# Patient Record
Sex: FEMALE | Race: WHITE | Employment: FULL TIME | URBAN - METROPOLITAN AREA
[De-identification: names, ages, dates, MRNs, and addresses within clinical notes are randomized per-mention and may not be internally consistent; named-entity substitution may affect disease eponyms.]

---

## 2018-04-10 PROBLEM — R07.89 OTHER CHEST PAIN: Status: ACTIVE | Noted: 2018-04-10

## 2018-05-29 ENCOUNTER — HOSPITAL ENCOUNTER (OUTPATIENT)
Dept: LAB | Age: 53
Discharge: HOME | End: 2018-05-29
Payer: COMMERCIAL

## 2018-05-29 DIAGNOSIS — R94.39 ABNORMAL FINDING ON CARDIOVASCULAR STRESS TEST: ICD-10-CM

## 2018-05-29 DIAGNOSIS — R07.89 OTHER CHEST PAIN: ICD-10-CM

## 2018-05-29 LAB
ANION GAP SERPL CALC-SCNC: 7 MMOL/L
BASOPHILS # BLD: 0.1 K/UL (ref 0–0.2)
BASOPHILS NFR BLD: 1 % (ref 0–2)
BUN SERPL-MCNC: 17 MG/DL (ref 6–23)
CALCIUM SERPL-MCNC: 9.9 MG/DL (ref 8.3–10.4)
CHLORIDE SERPL-SCNC: 104 MMOL/L (ref 98–107)
CO2 SERPL-SCNC: 28 MMOL/L (ref 21–32)
CREAT SERPL-MCNC: 1 MG/DL (ref 0.6–1)
DIFFERENTIAL METHOD BLD: ABNORMAL
EOSINOPHIL # BLD: 0.4 K/UL (ref 0–0.8)
EOSINOPHIL NFR BLD: 4 % (ref 0.5–7.8)
ERYTHROCYTE [DISTWIDTH] IN BLOOD BY AUTOMATED COUNT: 13.6 % (ref 11.9–14.6)
GLUCOSE SERPL-MCNC: 113 MG/DL (ref 65–100)
HCT VFR BLD AUTO: 42.1 % (ref 35.8–46.3)
HGB BLD-MCNC: 13.8 G/DL (ref 11.7–15.4)
LYMPHOCYTES # BLD: 2.3 K/UL (ref 0.5–4.6)
LYMPHOCYTES NFR BLD: 23 % (ref 13–44)
MCH RBC QN AUTO: 28.6 PG (ref 26.1–32.9)
MCHC RBC AUTO-ENTMCNC: 32.8 G/DL (ref 31.4–35)
MCV RBC AUTO: 87.2 FL (ref 79.6–97.8)
MONOCYTES # BLD: 0.8 K/UL (ref 0.1–1.3)
MONOCYTES NFR BLD: 8 % (ref 4–12)
NEUTS SEG # BLD: 6.5 K/UL (ref 1.7–8.2)
NEUTS SEG NFR BLD: 64 % (ref 43–78)
PLATELET # BLD AUTO: 260 K/UL (ref 150–450)
PMV BLD AUTO: 10.3 FL (ref 10.8–14.1)
POTASSIUM SERPL-SCNC: 3.7 MMOL/L (ref 3.5–5.1)
RBC # BLD AUTO: 4.83 M/UL (ref 4.05–5.25)
SODIUM SERPL-SCNC: 139 MMOL/L (ref 136–145)
WBC # BLD AUTO: 10 K/UL (ref 4.3–11.1)

## 2018-05-29 PROCEDURE — 80048 BASIC METABOLIC PNL TOTAL CA: CPT | Performed by: INTERNAL MEDICINE

## 2018-05-29 PROCEDURE — 85025 COMPLETE CBC W/AUTO DIFF WBC: CPT | Performed by: INTERNAL MEDICINE

## 2018-05-29 PROCEDURE — 36415 COLL VENOUS BLD VENIPUNCTURE: CPT | Performed by: INTERNAL MEDICINE

## 2018-05-31 RX ORDER — BETAMETHASONE DIPROPIONATE 0.5 MG/G
LOTION TOPICAL 2 TIMES DAILY
COMMUNITY

## 2018-05-31 RX ORDER — DICLOFENAC SODIUM 10 MG/G
GEL TOPICAL
COMMUNITY
End: 2020-05-01

## 2018-05-31 RX ORDER — ASPIRIN 81 MG/1
81 TABLET ORAL DAILY
COMMUNITY
End: 2020-05-01

## 2018-06-01 ENCOUNTER — HOSPITAL ENCOUNTER (OUTPATIENT)
Dept: CARDIAC CATH/INVASIVE PROCEDURES | Age: 53
Discharge: HOME | End: 2018-06-01
Attending: INTERNAL MEDICINE | Admitting: INTERNAL MEDICINE
Payer: COMMERCIAL

## 2018-06-01 VITALS
RESPIRATION RATE: 18 BRPM | OXYGEN SATURATION: 96 % | DIASTOLIC BLOOD PRESSURE: 67 MMHG | WEIGHT: 293 LBS | SYSTOLIC BLOOD PRESSURE: 129 MMHG | HEIGHT: 66 IN | BODY MASS INDEX: 47.09 KG/M2 | HEART RATE: 72 BPM

## 2018-06-01 PROCEDURE — 74011000250 HC RX REV CODE- 250: Performed by: INTERNAL MEDICINE

## 2018-06-01 PROCEDURE — 77030019569 HC BND COMPR RAD TERU -B

## 2018-06-01 PROCEDURE — 77030015766

## 2018-06-01 PROCEDURE — C1894 INTRO/SHEATH, NON-LASER: HCPCS

## 2018-06-01 PROCEDURE — C1769 GUIDE WIRE: HCPCS

## 2018-06-01 PROCEDURE — 74011636320 HC RX REV CODE- 636/320: Performed by: INTERNAL MEDICINE

## 2018-06-01 PROCEDURE — 74011250636 HC RX REV CODE- 250/636

## 2018-06-01 PROCEDURE — 93458 L HRT ARTERY/VENTRICLE ANGIO: CPT

## 2018-06-01 PROCEDURE — 77030004534 HC CATH ANGI DX INFN CARD -A

## 2018-06-01 PROCEDURE — 99152 MOD SED SAME PHYS/QHP 5/>YRS: CPT

## 2018-06-01 PROCEDURE — 74011250636 HC RX REV CODE- 250/636: Performed by: INTERNAL MEDICINE

## 2018-06-01 RX ORDER — LIDOCAINE HYDROCHLORIDE 20 MG/ML
1-20 INJECTION, SOLUTION INFILTRATION; PERINEURAL
Status: DISCONTINUED | OUTPATIENT
Start: 2018-06-01 | End: 2018-06-01 | Stop reason: HOSPADM

## 2018-06-01 RX ORDER — HEPARIN SODIUM 200 [USP'U]/100ML
3 INJECTION, SOLUTION INTRAVENOUS CONTINUOUS
Status: DISCONTINUED | OUTPATIENT
Start: 2018-06-01 | End: 2018-06-01 | Stop reason: HOSPADM

## 2018-06-01 RX ORDER — SODIUM CHLORIDE 0.9 % (FLUSH) 0.9 %
5-10 SYRINGE (ML) INJECTION AS NEEDED
Status: DISCONTINUED | OUTPATIENT
Start: 2018-06-01 | End: 2018-06-01 | Stop reason: HOSPADM

## 2018-06-01 RX ORDER — SODIUM CHLORIDE 0.9 % (FLUSH) 0.9 %
5-10 SYRINGE (ML) INJECTION EVERY 8 HOURS
Status: DISCONTINUED | OUTPATIENT
Start: 2018-06-01 | End: 2018-06-01 | Stop reason: HOSPADM

## 2018-06-01 RX ORDER — SODIUM CHLORIDE 9 MG/ML
250 INJECTION, SOLUTION INTRAVENOUS CONTINUOUS
Status: DISCONTINUED | OUTPATIENT
Start: 2018-06-01 | End: 2018-06-01 | Stop reason: HOSPADM

## 2018-06-01 RX ORDER — MIDAZOLAM HYDROCHLORIDE 1 MG/ML
.5-2 INJECTION, SOLUTION INTRAMUSCULAR; INTRAVENOUS
Status: DISCONTINUED | OUTPATIENT
Start: 2018-06-01 | End: 2018-06-01 | Stop reason: HOSPADM

## 2018-06-01 RX ORDER — FENTANYL CITRATE 50 UG/ML
25-50 INJECTION, SOLUTION INTRAMUSCULAR; INTRAVENOUS
Status: DISCONTINUED | OUTPATIENT
Start: 2018-06-01 | End: 2018-06-01 | Stop reason: HOSPADM

## 2018-06-01 RX ADMIN — FENTANYL CITRATE 50 MCG: 50 INJECTION, SOLUTION INTRAMUSCULAR; INTRAVENOUS at 11:05

## 2018-06-01 RX ADMIN — HEPARIN SODIUM 3 ML/HR: 200 INJECTION, SOLUTION INTRAVENOUS at 11:01

## 2018-06-01 RX ADMIN — LIDOCAINE HYDROCHLORIDE 60 MG: 20 INJECTION, SOLUTION INFILTRATION; PERINEURAL at 11:15

## 2018-06-01 RX ADMIN — FENTANYL CITRATE 25 MCG: 50 INJECTION, SOLUTION INTRAMUSCULAR; INTRAVENOUS at 11:15

## 2018-06-01 RX ADMIN — MIDAZOLAM HYDROCHLORIDE 1 MG: 1 INJECTION, SOLUTION INTRAMUSCULAR; INTRAVENOUS at 11:15

## 2018-06-01 RX ADMIN — MIDAZOLAM HYDROCHLORIDE 2 MG: 1 INJECTION, SOLUTION INTRAMUSCULAR; INTRAVENOUS at 11:05

## 2018-06-01 RX ADMIN — IOPAMIDOL 80 ML: 755 INJECTION, SOLUTION INTRAVENOUS at 11:24

## 2018-06-01 RX ADMIN — HEPARIN SODIUM 2 ML: 10000 INJECTION, SOLUTION INTRAVENOUS; SUBCUTANEOUS at 11:15

## 2018-06-01 NOTE — DISCHARGE INSTRUCTIONS
Cardiac Catheterization/Angiography Discharge Instructions    *Check the puncture site frequently for swelling or bleeding. If you see any bleeding, lie down and apply pressure over the area with a clean town or washcloth. Notify your doctor for any redness, swelling, drainage or oozing from the puncture site. Notify your doctor for any fever or chills. *If the leg or arm with the puncture becomes cold, numb or painful, call Dr Ambreen Munoz at  [phone]    *Activity should be limited for the next 48 hours. Climb stairs as little as possible and avoid any stooping, bending or strenuous activity for 48 hours. No heavy lifting (anything over 10 pounds) for three days. *Do not drive for 48 hours. *You may resume your usual diet. Drink more fluids than usual.    *Have a responsible person drive you home and stay with you for at least 24 hours after your heart catheterization/angiography. *You may remove the bandage from your Right and Arm in 24 hours. You may shower in 24 hours. No tub baths, hot tubs or swimming for one week. Do not place any lotions, creams, powders, ointments over the puncture site for one week. You may place a clean band-aid over the puncture site each day for 5 days. Change this daily.

## 2018-06-01 NOTE — PROCEDURES
[address] [facility] CATH    Dorene Gutierrez  MR#: [id number]  : 1965  ACCOUNT #: [de-identified]   DATE OF SERVICE: 2018    PRIMARY CARDIOLOGIST:  Elham Quinonez MD    PRIMARY CARE PHYSICIAN:  Ozzie Truner NP    BRIEF HISTORY:  The patient is a 58-year-old morbidly obese female who was recently seen for chest pain syndrome. She underwent stress testing with moderate risk anteroseptal ischemia with recurrent chest discomfort, referred for cardiac catheterization. DESCRIPTION OF PROCEDURE:  After informed consent, she was prepped and draped in the usual sterile fashion. The right wrist was infiltrated with lidocaine. The right radial artery was accessed. A 6-Ukrainian sheath was advanced. A 5-Ukrainian Tiger catheter was utilized for left and right coronary injections and a 5-Ukrainian angled pigtail for left ventriculography. CONSCIOUS SEDATION:  Start time 10:58, end time 11:18. MEDICATIONS:  3 mg of Versed and 75 mcg of fentanyl. MONITORING RN:  Zhane Lira    FINDINGS:  1. Left ventricle:  Normal left ventricular size, normal left ventricular systolic function, ejection fraction 65%. There is no significant mitral regurgitation. There is no aortic valve gradient. Left ventricular end-diastolic pressure is measured at 11 mmHg. 2.  Left main:  Left main is large, bifurcates in the LAD and circumflex systems, appears angiographically normal.  3.  Left anterior descending coronary artery: It is a very large vessel, gives rise to a large mid vessel diagonal.  The entire LAD system appears angiographically normal.  4.  Left circumflex coronary artery: It is a large system. It is a conduit to a single large obtuse marginal.  Entire system appears angiographically normal.  5.  Right coronary artery:   It is a large anatomically dominant vessel, gives rise to a moderate sized posterior descending and moderate sized posterolateral branches appear angiographically normal.    Successful hemostasis with pneumatic radial band. CONCLUSION:  1. Normal left ventricular systolic function with normal end-diastolic pressures. 2.  Normal appearing coronary arteries.       MD ADI Russell / Chel Mercado  D: 06/01/2018 11:34     T: 06/01/2018 11:51  JOB #: [id number]  CC: Anabela Combs NP  [facility] INTERNAL MEDICINE  [address] [place], [address]  [address]  CC: Leon Gomez MD

## 2018-06-01 NOTE — PROCEDURES
Brief Cardiac Procedure Note    Patient: Saida Arteaga MRN: [id number]  SSN: [id number]    YOB: 1965  Age: 46 y.o. Sex: female      Date of Procedure: 6/1/2018     Pre-procedure Diagnosis: Atypical Angina    Post-procedure Diagnosis: Non-cardiac Chest Pain    Procedure: Left Heart Catheterization    Brief Description of Procedure: See note    Performed By: uY Kaplan MD     Assistants: None    Anesthesia: Moderate Sedation    Estimated Blood Loss: Less than 10 mL      Specimens: None    Implants: None    Findings:   LV:  EF 65%  LM:  NML  LAD:  NML  LCx:  NML  RCA:  NML    Complications: None    Recommendations: Continue medical therapy.     Signed By: Yu Kaplan MD     June 1, 2018

## 2018-06-01 NOTE — PROGRESS NOTES
Patient received to [address] [place] [address] room # 12  Ambulatory from [facility]. Patient scheduled for [facility] today with Dr Ezra Oliva. Procedure reviewed & questions answered, voiced good understanding consent obtained & placed on chart. All medications and medical history reviewed. Will prep patient per orders. Patient & family updated on plan of care. The patient has a fraility score of 3-MANAGING WELL, based on patient's ability to perform ADL's independently, patient A&Ox3, patient able to ambulate to room without difficulty.

## 2018-06-01 NOTE — IP AVS SNAPSHOT
[facility] 
 
 
 [address] 
[phone] Patient: Kathy Dior MRN: [id number] :1965 Discharge Summary 2018 Kathy Dior MRN[de-identified]  [id number] Admission Information Provider Pager Service Admission Date Expected D/C Date Latesha Potts MD  CARDIAC CATH LAB 2018 Actual LOS Patient Class 0 days OUTPATIENT Follow-up Information Follow up With Details Comments Contact Info Cricket Martin MD On 2018 @ 10:30 am  [address] [facility] [address] [facility] [address] 
[phone] My Medications TAKE these medications as instructed Instructions Each Dose to Equal  
 Morning Noon Evening Bedtime ALPRAZolam 0.25 mg tablet Commonly known as:  Elviamond [place] Your last dose was: Your next dose is: Take 0.25 mg by mouth two (2) times a day. 0.25 mg  
    
   
   
   
  
 aspirin delayed-release 81 mg tablet Your last dose was: Your next dose is: Take 81 mg by mouth daily. 81 mg  
    
   
   
   
  
 B-12 KIT INJECTION Your last dose was: Your next dose is:    
   
   
 by Injection route every month. betamethasone dipropionate 0.05 % topical lotion Commonly known as:  Yetta Miracle Your last dose was: Your next dose is:    
   
   
 Apply  to affected area two (2) times a day. cholecalciferol 1,000 unit tablet Commonly known as:  VITAMIN D3 Your last dose was: Your next dose is: Take  by mouth daily. diclofenac 1 % Gel Commonly known as:  VOLTAREN Your last dose was: Your next dose is:    
   
   
 Apply  to affected area four (4) times daily as needed. hydroCHLOROthiazide 12.5 mg tablet Commonly known as:  HYDRODIURIL  
   
 Your last dose was: Your next dose is: Take 12.5 mg by mouth daily. 12.5 mg  
    
   
   
   
  
 levothyroxine 112 mcg tablet Commonly known as:  SYNTHROID Your last dose was: Your next dose is: Take 112 mcg by mouth Daily (before breakfast). 112 mcg  
    
   
   
   
  
 metFORMIN 500 mg tablet Commonly known as:  GLUCOPHAGE Your last dose was: Your next dose is: Take  by mouth two (2) times daily (with meals). metoprolol succinate 25 mg XL tablet Commonly known as:  TOPROL-XL Your last dose was: Your next dose is: Take 1 Tab by mouth daily. 25 mg  
    
   
   
   
  
 nitroglycerin 0.4 mg SL tablet Commonly known as:  NITROSTAT Your last dose was: Your next dose is:    
   
   
 1 Tab by SubLINGual route every five (5) minutes as needed for Chest Pain. Indications: Angina  
 0.4 mg PROTONIX 40 mg tablet Generic drug:  pantoprazole Your last dose was: Your next dose is: Take 40 mg by mouth two (2) times a day. 40 mg  
    
   
   
   
  
  
  
  
 
  
General Information Please provide this summary of care documentation to your next provider. Allergies No Known Allergies Current Immunizations  Never Reviewed No immunizations on file. Discharge Instructions Discharge Instructions Cardiac Catheterization/Angiography Discharge Instructions *Check the puncture site frequently for swelling or bleeding. If you see any bleeding, lie down and apply pressure over the area with a clean town or washcloth. Notify your doctor for any redness, swelling, drainage or oozing from the puncture site. Notify your doctor for any fever or chills. *If the leg or arm with the puncture becomes cold, numb or painful, call Dr Chyu Foster at  [phone] *Activity should be limited for the next 48 hours. Climb stairs as little as possible and avoid any stooping, bending or strenuous activity for 48 hours. No heavy lifting (anything over 10 pounds) for three days. *Do not drive for 48 hours. *You may resume your usual diet. Drink more fluids than usual. 
 
*Have a responsible person drive you home and stay with you for at least 24 hours after your heart catheterization/angiography. *You may remove the bandage from your Right and Arm in 24 hours. You may shower in 24 hours. No tub baths, hot tubs or swimming for one week. Do not place any lotions, creams, powders, ointments over the puncture site for one week. You may place a clean band-aid over the puncture site each day for 5 days. Change this daily. Discharge Orders None  
  
` Patient Signature:  ____________________________________________________________ Date:  ____________________________________________________________  
  
 [address] Provider Signature:  ____________________________________________________________ Date:  ____________________________________________________________

## 2018-06-01 NOTE — PROGRESS NOTES
TRANSFER - OUT REPORT:    Verbal report given to Josias Beaulieu RN on [address]  being transferred to [facility] for routine progression of care       Report consisted of patients Situation, Background, Assessment and Recommendations(SBAR). Information from the following report(s) SBAR, Kardex, Procedure Summary and MAR was reviewed with the receiving nurse. Opportunity for questions and clarification was provided.       [facility] with Dr Denzel Garrido  No intervention  3 versed  75 fentanyl  Right radial R band 13ml at 1125

## 2019-08-13 ENCOUNTER — HOSPITAL ENCOUNTER (OUTPATIENT)
Dept: GENERAL RADIOLOGY | Age: 54
Discharge: HOME | End: 2019-08-13
Payer: COMMERCIAL

## 2019-08-13 DIAGNOSIS — M54.41 LUMBAGO WITH SCIATICA, RIGHT SIDE: ICD-10-CM

## 2019-08-13 PROCEDURE — 72202 X-RAY EXAM SI JOINTS 3/> VWS: CPT

## 2020-05-01 PROBLEM — R00.0 TACHYCARDIA: Status: ACTIVE | Noted: 2020-05-01

## 2020-05-01 PROBLEM — E66.01 CLASS 3 SEVERE OBESITY DUE TO EXCESS CALORIES WITHOUT SERIOUS COMORBIDITY WITH BODY MASS INDEX (BMI) OF 40.0 TO 44.9 IN ADULT (HCC): Status: ACTIVE | Noted: 2020-05-01

## 2020-05-28 PROBLEM — G47.33 OSA (OBSTRUCTIVE SLEEP APNEA): Status: ACTIVE | Noted: 2020-05-28

## 2020-05-28 PROBLEM — R00.1 BRADYCARDIA: Status: ACTIVE | Noted: 2020-05-28

## 2021-10-08 PROBLEM — U07.1 COVID-19: Status: ACTIVE | Noted: 2021-10-08

## 2022-01-26 ENCOUNTER — HOSPITAL ENCOUNTER (OUTPATIENT)
Dept: GENERAL RADIOLOGY | Age: 57
Discharge: HOME | End: 2022-01-26
Payer: COMMERCIAL

## 2022-01-26 ENCOUNTER — HOSPITAL ENCOUNTER (OUTPATIENT)
Dept: LAB | Age: 57
Discharge: HOME | End: 2022-01-26
Payer: COMMERCIAL

## 2022-01-26 DIAGNOSIS — R06.02 SOB (SHORTNESS OF BREATH): ICD-10-CM

## 2022-01-26 DIAGNOSIS — R00.0 TACHYCARDIA: ICD-10-CM

## 2022-01-26 LAB
BNP SERPL-MCNC: 18 PG/ML (ref 5–125)
CRP SERPL HS-MCNC: 17.2 MG/L
D DIMER PPP FEU-MCNC: 0.54 UG/ML(FEU)
T4 FREE SERPL-MCNC: 1.1 NG/DL (ref 0.78–1.46)

## 2022-01-26 PROCEDURE — 85379 FIBRIN DEGRADATION QUANT: CPT

## 2022-01-26 PROCEDURE — 86141 C-REACTIVE PROTEIN HS: CPT

## 2022-01-26 PROCEDURE — 36415 COLL VENOUS BLD VENIPUNCTURE: CPT

## 2022-01-26 PROCEDURE — 84439 ASSAY OF FREE THYROXINE: CPT

## 2022-01-26 PROCEDURE — 71046 X-RAY EXAM CHEST 2 VIEWS: CPT

## 2022-01-26 PROCEDURE — 83880 ASSAY OF NATRIURETIC PEPTIDE: CPT

## 2022-02-17 ENCOUNTER — HOSPITAL ENCOUNTER (OUTPATIENT)
Dept: CT IMAGING | Age: 57
Discharge: HOME | End: 2022-02-17
Attending: INTERNAL MEDICINE

## 2022-02-17 DIAGNOSIS — R06.02 SOB (SHORTNESS OF BREATH): ICD-10-CM

## 2022-02-17 RX ORDER — SODIUM CHLORIDE 0.9 % (FLUSH) 0.9 %
10 SYRINGE (ML) INJECTION
Status: COMPLETED | OUTPATIENT
Start: 2022-02-17 | End: 2022-02-17

## 2022-02-17 RX ADMIN — Medication 10 ML: at 09:38

## 2022-03-18 PROBLEM — R94.39 ABNORMAL FINDING ON CARDIOVASCULAR STRESS TEST: Status: ACTIVE | Noted: 2018-05-29

## 2022-03-18 PROBLEM — U07.1 COVID-19: Status: ACTIVE | Noted: 2021-10-08

## 2022-03-19 PROBLEM — R06.02 SOB (SHORTNESS OF BREATH): Status: ACTIVE | Noted: 2022-01-26

## 2022-03-19 PROBLEM — E66.01 CLASS 3 SEVERE OBESITY DUE TO EXCESS CALORIES WITHOUT SERIOUS COMORBIDITY WITH BODY MASS INDEX (BMI) OF 40.0 TO 44.9 IN ADULT (HCC): Status: ACTIVE | Noted: 2020-05-01

## 2022-03-19 PROBLEM — R00.0 TACHYCARDIA: Status: ACTIVE | Noted: 2020-05-01

## 2022-03-19 PROBLEM — G47.33 OSA (OBSTRUCTIVE SLEEP APNEA): Status: ACTIVE | Noted: 2020-05-28

## 2022-03-19 PROBLEM — R07.89 OTHER CHEST PAIN: Status: ACTIVE | Noted: 2018-04-10

## 2022-03-19 PROBLEM — E66.813 CLASS 3 SEVERE OBESITY DUE TO EXCESS CALORIES WITHOUT SERIOUS COMORBIDITY WITH BODY MASS INDEX (BMI) OF 40.0 TO 44.9 IN ADULT: Status: ACTIVE | Noted: 2020-05-01

## 2022-03-20 PROBLEM — R00.1 BRADYCARDIA: Status: ACTIVE | Noted: 2020-05-28

## 2022-04-26 PROBLEM — E11.9 TYPE 2 DIABETES MELLITUS (HCC): Status: ACTIVE | Noted: 2022-04-26

## 2022-04-26 PROBLEM — E78.00 PURE HYPERCHOLESTEROLEMIA: Status: ACTIVE | Noted: 2022-04-26

## 2022-07-20 ENCOUNTER — TELEPHONE (OUTPATIENT)
Dept: CARDIOLOGY CLINIC | Age: 57
End: 2022-07-20

## 2022-07-20 RX ORDER — GLIPIZIDE 2.5 MG/1
2.5 TABLET, EXTENDED RELEASE ORAL DAILY
COMMUNITY
Start: 2022-06-22

## 2022-07-20 NOTE — TELEPHONE ENCOUNTER
Increased SOB with any activity continues since she had COVID-19 a 2nd time 6/15/22. No SOB when resting on couch. O2-96% at rest, 93-94% with any activity. Very tired. Swollen ankles when she is up walking around during the day. No weight gain. HR-67-70 at rest, 90-99 with any exertion. No chest pain. States pulmonologist thought she had \"long COVID\". Concerned that SOB and tiredness are not improving with time after 2nd COVID-19 infection. Requests appointment with Dr. Ely Doran. Scheduled next available appointment with Dr. Ely Doran on 8/1/22 at 3:30 pm at [facility]. Advised patient to call with any questions or concerns prior to appointment. Patient verbalized understanding.

## 2022-07-20 NOTE — TELEPHONE ENCOUNTER
Patient called stating she has been experiencing SOB and tiredness. No active chest pain. Please call patient and advise.

## 2022-08-01 ENCOUNTER — OFFICE VISIT (OUTPATIENT)
Dept: CARDIOLOGY CLINIC | Age: 57
End: 2022-08-01
Payer: COMMERCIAL

## 2022-08-01 VITALS
HEART RATE: 72 BPM | BODY MASS INDEX: 46.86 KG/M2 | SYSTOLIC BLOOD PRESSURE: 128 MMHG | WEIGHT: 291.6 LBS | DIASTOLIC BLOOD PRESSURE: 66 MMHG | HEIGHT: 66 IN

## 2022-08-01 DIAGNOSIS — I10 ESSENTIAL HYPERTENSION, BENIGN: Primary | ICD-10-CM

## 2022-08-01 DIAGNOSIS — R00.0 TACHYCARDIA: ICD-10-CM

## 2022-08-01 DIAGNOSIS — R06.02 SOB (SHORTNESS OF BREATH): ICD-10-CM

## 2022-08-01 DIAGNOSIS — E11.9 TYPE 2 DIABETES MELLITUS WITHOUT COMPLICATION, WITHOUT LONG-TERM CURRENT USE OF INSULIN (HCC): ICD-10-CM

## 2022-08-01 DIAGNOSIS — E66.01 CLASS 3 SEVERE OBESITY DUE TO EXCESS CALORIES WITHOUT SERIOUS COMORBIDITY WITH BODY MASS INDEX (BMI) OF 40.0 TO 44.9 IN ADULT (HCC): ICD-10-CM

## 2022-08-01 PROCEDURE — 99214 OFFICE O/P EST MOD 30 MIN: CPT | Performed by: INTERNAL MEDICINE

## 2022-08-01 RX ORDER — NEBIVOLOL 10 MG/1
10 TABLET ORAL DAILY
Qty: 90 TABLET | Refills: 3 | Status: SHIPPED | OUTPATIENT
Start: 2022-08-01

## 2022-08-01 ASSESSMENT — ENCOUNTER SYMPTOMS: SHORTNESS OF BREATH: 0

## 2022-08-01 NOTE — PROGRESS NOTES
[address], [address]  PHONE: [phone]    Favian Irizarry  1965      SUBJECTIVE:   Favian Irizarry is a 61 y.o. female seen for a follow up visit regarding the following:     Chief Complaint   Patient presents with    Shortness of Breath       HPI:    58-year-old female comes back for follow-up of her history of chronic chest pain chronic fatigue morbid obesity diabetes. She had a heart catheterization 2018 showing normal epicardial coronary arteries. She had a stress echo since then showing normal LV function. Her sugar has been poorly controlled since this year with A1c over 12 including 1 in May. Has not been losing significant amount of weight. Past Medical History, Past Surgical History, Family history, Social History, and Medications were all reviewed with the patient today and updated as necessary. Allergies   Allergen Reactions    Pravastatin Anaphylaxis    Levofloxacin Other (See Comments)     Headaches     Past Medical History:   Diagnosis Date    Arthritis     Diabetes (HCC)     GERD (gastroesophageal reflux disease)     Hypertension     KENNA (obstructive sleep apnea) 5/28/2020    Psychiatric disorder      Past Surgical History:   Procedure Laterality Date    CHOLECYSTECTOMY      GI      EGD    TUBAL LIGATION       No family history on file. Social History     Tobacco Use    Smoking status: Never    Smokeless tobacco: Never   Substance Use Topics    Alcohol use: Yes       ROS:    Review of Systems   Constitutional: Negative for decreased appetite. Cardiovascular:  Negative for chest pain, dyspnea on exertion and leg swelling. Respiratory:  Negative for shortness of breath. Hematologic/Lymphatic: Negative for bleeding problem.          PHYSICAL EXAM:    /66   Pulse 72   Ht 5' 6\" (1.676 m)   Wt 291 lb 9.6 oz (132.3 kg)   BMI 47.07 kg/m²        Wt Readings from Last 3 Encounters:   08/01/22 291 lb 9.6 oz (132.3 kg)   02/09/22 (!) 307 lb 12.8 oz (139.6 kg)   02/02/22 (!) 311 lb (141.1 kg)     BP Readings from Last 3 Encounters:   08/01/22 128/66   02/09/22 128/80   02/02/22 120/80         Physical Exam  Cardiovascular:      Rate and Rhythm: Normal rate and regular rhythm. Pulses: Normal pulses. Heart sounds:     No gallop. Pulmonary:      Effort: Pulmonary effort is normal.   Musculoskeletal:         General: No swelling. Neurological:      General: No focal deficit present. Mental Status: She is alert. Medical problems and test results were reviewed with the patient today. A1c in May was 12.3      ASSESSMENT and PLAN    Pedro was seen today for shortness of breath. Diagnoses and all orders for this visit:    Essential hypertension, benign she will continue Bystolic and hydrochlorothiazide although she wants to come off the HCTZ she can see higher blood pressures    Type 2 diabetes mellitus without complication, without long-term current use of insulin (HCC) A1c has not been stable she may need to be added on some different medications. Tachycardia she is tolerating Bystolic pretty well but you might sometimes make you fatigued but she is doing well with it    Class 3 severe obesity due to excess calories without serious comorbidity with body mass index (BMI) of 40.0 to 44.9 in [facility]) continue encourage weight loss which is been very difficult for her to do    SOB (shortness of breath) she is currently stable. Echoes have been normal with normal ejection fraction. Other orders  -     nebivolol (BYSTOLIC) 10 MG tablet; Take 1 tablet by mouth in the morning. [unfilled]      No follow-up provider specified.     Raisa Henderson MD  8/1/2022  5:49 PM

## 2022-09-21 ENCOUNTER — TELEPHONE (OUTPATIENT)
Dept: CARDIOLOGY CLINIC | Age: 57
End: 2022-09-21

## 2022-09-21 RX ORDER — NITROGLYCERIN 0.4 MG/1
0.4 TABLET SUBLINGUAL EVERY 5 MIN PRN
Qty: 25 TABLET | Refills: 3 | Status: SHIPPED | OUTPATIENT
Start: 2022-09-21

## 2022-09-21 NOTE — TELEPHONE ENCOUNTER
Frequent episodes of left sided chest \"discomfort\" radiating as aching pain to neck, left shoulder, and left arm, at rest, lasting only a few minutes. No SOB, nausea, or diaphoresis with chest discomfort. Has not taken NTG. NTG is over 3year old. Chest discomfort does not increase with exertion. Increased SOB with increased HR-80-90 and lower O2-90% on exertion since 6/2022 COVID.  SOB, HR, and O2 improve after resting for a few minutes. Continued slight swelling in feet and ankles. Increased swelling in hands when she wakes up in mornings. Taking Bystolic 10 mg qd and HCTZ 12.5 mg qd. PCP has referred her to rheumatology for evaluation for possible Sjogren's syndrome because of increased inflammation in gums and eyes. Concerned about possible inflammation around heart. Next scheduled appointment with Dr. Dilcia Thapa is 10/27/22. Asks for earlier appointment with Dr. Dilcia Thapa and asks for Dr. Neelam Michel recommendations for chest discomfort. Advised patient that I will notify Dr. Dilcia Thapa of above and call with his recommendations. Reviewed NTG instructions with patient. Advised patient to call office or go to [facility] for immediate evaluation of any chest pain or SOB not relieved by NTG. Patient verbalized understanding.    Requested Prescriptions     Signed Prescriptions Disp Refills    nitroGLYCERIN (NITROSTAT) 0.4 MG SL tablet 25 tablet 3     Sig: Place 1 tablet under the tongue every 5 minutes as needed for Chest pain     Authorizing Provider: MADHAVI Wood     Ordering User: Janak Tran     NTG, as above, E-prescribed to [address] [place] [address] in [facility] at patient's request.

## 2022-09-22 NOTE — TELEPHONE ENCOUNTER
MD Sobia Bella, RN  Caller: Unspecified (Yesterday, 11:07 AM)  Patient had an echo earlier this year showing normal function no obvious inflammation around her heart. The heart would not usually cause swelling in the hands when she wakes up in the morning.   She has had chronic intermittent chest pain and had a normal cath in the past.  She can move up her appointment agree with seeing rheumatology to evaluate

## 2022-09-22 NOTE — TELEPHONE ENCOUNTER
Advised patient of Dr. Ludwig Failing response. Advised patient to keep 10/28/22 appointment with Dr. Geraldo Rivas, and call with any questions or concerns prior to appointment. Encouraged patient to go to [facility] ER for immediate evaluation of any chest pain or SOB not relieved by NTG. Patient verbalized understanding.

## 2022-09-28 ENCOUNTER — OFFICE VISIT (OUTPATIENT)
Dept: CARDIOLOGY CLINIC | Age: 57
End: 2022-09-28
Payer: COMMERCIAL

## 2022-09-28 VITALS
SYSTOLIC BLOOD PRESSURE: 126 MMHG | WEIGHT: 288.8 LBS | HEIGHT: 66 IN | HEART RATE: 72 BPM | BODY MASS INDEX: 46.41 KG/M2 | DIASTOLIC BLOOD PRESSURE: 70 MMHG

## 2022-09-28 DIAGNOSIS — I10 ESSENTIAL HYPERTENSION, BENIGN: Primary | ICD-10-CM

## 2022-09-28 DIAGNOSIS — G47.33 OSA (OBSTRUCTIVE SLEEP APNEA): ICD-10-CM

## 2022-09-28 DIAGNOSIS — E11.9 TYPE 2 DIABETES MELLITUS WITHOUT COMPLICATION, WITHOUT LONG-TERM CURRENT USE OF INSULIN (HCC): ICD-10-CM

## 2022-09-28 DIAGNOSIS — E78.00 PURE HYPERCHOLESTEROLEMIA: ICD-10-CM

## 2022-09-28 DIAGNOSIS — E66.01 CLASS 3 SEVERE OBESITY DUE TO EXCESS CALORIES WITHOUT SERIOUS COMORBIDITY WITH BODY MASS INDEX (BMI) OF 40.0 TO 44.9 IN ADULT (HCC): ICD-10-CM

## 2022-09-28 PROCEDURE — 99214 OFFICE O/P EST MOD 30 MIN: CPT | Performed by: INTERNAL MEDICINE

## 2022-09-28 RX ORDER — ISOSORBIDE MONONITRATE 30 MG/1
30 TABLET, EXTENDED RELEASE ORAL DAILY
Qty: 90 TABLET | Refills: 3 | Status: SHIPPED | OUTPATIENT
Start: 2022-09-28

## 2022-09-28 ASSESSMENT — ENCOUNTER SYMPTOMS: SHORTNESS OF BREATH: 0

## 2022-09-28 NOTE — PROGRESS NOTES
[address], [address] [facility], [address] [place] [address]  PHONE: [phone]    Davin Vazquez  1965      SUBJECTIVE:   Davin Vazquez is a 61 y.o. female seen for a follow up visit regarding the following:     Chief Complaint   Patient presents with    Hypertension       HPI:    69-year-old female comes back for follow-up she has had a history of chronic chest pain saw Dr. Severo Seltzer in the past had a cardiac catheterization 4 years ago with widely patent normal-appearing coronary arteries. She has had a history of morbid obesity history of diabetes been poorly controlled and last time it was like over 12 her A1c. She has had some problems with swelling in her hands she has had some discomfort in her chest at times. She has had a history of probable sleep apnea with slow heart rate at night and never got her be able to do the sleep study and possible CPAP. She says currently she may be able to do that. Also has shortness of breath and her family doctor is thinking about looking up for Sjogren's syndrome. Patient had echo earlier this year LV systolic function is preserved with normal ejection fraction. No major valve disease noted. We did a stress echo believe in the last year with no obvious wall motion abnormality or ulcer exercise capacity is reduced      Past Medical History, Past Surgical History, Family history, Social History, and Medications were all reviewed with the patient today and updated as necessary. Allergies   Allergen Reactions    Pravastatin Anaphylaxis    Levofloxacin Other (See Comments)     Headaches     Past Medical History:   Diagnosis Date    Arthritis     Diabetes (HCC)     GERD (gastroesophageal reflux disease)     Hypertension     KENNA (obstructive sleep apnea) 5/28/2020    Psychiatric disorder      Past Surgical History:   Procedure Laterality Date    CHOLECYSTECTOMY      GI      EGD    TUBAL LIGATION       No family history on file.    Social History     Tobacco Use Smoking status: Never    Smokeless tobacco: Never   Substance Use Topics    Alcohol use: Yes       ROS:    Review of Systems   Constitutional: Negative for weight gain. Cardiovascular:  Positive for chest pain. Negative for dyspnea on exertion, leg swelling and near-syncope. Respiratory:  Negative for shortness of breath. PHYSICAL EXAM:    /70   Pulse 72   Ht 5' 6\" (1.676 m)   Wt 288 lb 12.8 oz (131 kg)   BMI 46.61 kg/m²        Wt Readings from Last 3 Encounters:   09/28/22 288 lb 12.8 oz (131 kg)   08/01/22 291 lb 9.6 oz (132.3 kg)   02/09/22 (!) 307 lb 12.8 oz (139.6 kg)     BP Readings from Last 3 Encounters:   09/28/22 126/70   08/01/22 128/66   02/09/22 128/80         Physical Exam  Constitutional:       General: She is not in acute distress. Appearance: She is obese. Cardiovascular:      Rate and Rhythm: Normal rate and regular rhythm. Heart sounds:     No gallop. Musculoskeletal:         General: No swelling. Skin:     General: Skin is warm. Neurological:      Mental Status: She is alert. Medical problems and test results were reviewed with the patient today. No results found for any visits on 09/28/22. No results found for: NA, K, CL, CO2, AGAP, GLU, BUN, CREA, GFRAA, CA, GFRAA  No results found for: CHOL, CHOLPOCT, CHOLX, CHLST, CHOLV, HDL, HDLPOC, HDLC, LDL, LDLC, VLDLC, VLDL, TGLX, TRIGL      ASSESSMENT and PLAN    Pedro was seen today for hypertension. Diagnoses and all orders for this visit:    Essential hypertension, benign blood pressure today looks stable currently on  -     [address] Sleep Medicine, [facility]    Type 2 diabetes mellitus without complication, without long-term current use of insulin ([address] she claims her A1c is down some but still upper nines not well controlled I will add some Jardiance which may have a benefit from diastolic dysfunction and help her diabetes.   I warned her about yeast infection    KENNA (obstructive sleep apnea) I suspect she has sleep apnea and she finally is willing to go do a sleep study I will have her do that  -     [address] Sleep Medicine, [place]    Pure hypercholesterolemia currently stable    Class 3 severe obesity due to excess calories without serious comorbidity with body mass index (BMI) of 40.0 to 44.9 in adult [facility]) she is lost just a few pounds but not significant amount of weight which certainly could help in the long run help her endurance. Improved sugar etc.  Chronic chest pain. Patient had normal epicardial coronary arteries back 4 years ago but I doubt she is developing severe obstructive disease at this time microvascular disease as a potential possibility. She is already a little bit of beta-blocker and will low-dose Imdur  Other orders  -     empagliflozin (JARDIANCE) 10 MG tablet; Take 1 tablet by mouth daily  -     isosorbide mononitrate (IMDUR) 30 MG extended release tablet; Take 1 tablet by mouth daily        [unfilled]      No follow-up provider specified.     Marlon Grimm MD  9/28/2022  3:44 PM

## 2022-11-14 NOTE — PROGRESS NOTES
[facility]  [address] [facility] , [address], [address] [facility]  [phone]    Patient Name:  Svitlana García  YOB: 1965      Office Visit 11/15/2022    CHIEF COMPLAINT:      Chief Complaint   Patient presents with    New Patient       HISTORY OF PRESENT ILLNESS:      The patient present in outpatient consultation at the request of Dr. Gabby Rocha for management of obstructive sleep apnea. The patient underwent a diagnostic HST at [facility] and she was started on CPAP but she could not use it. The result of the study is not available at this point. She indicated she has ongoing snoring, intermittent episodes of gasping for breath with her sleep, morning headaches, daytime sleepiness, and recent weight gain. Additional symptoms include waking up with a dry mouth, difficulty falling asleep and staying asleep, trouble remembering. There is no history of cataplexy, hypnagogic hallucinations, or sleep paralysis. In addition, there is no history of frequent leg movements, kicking at night. The patient indicated that she had claustrophobia and thus why she did not try the CPAP machine previously. She had COVID infection in September 2021 and she had monoclonal antibodies at that time. She had all her COVID infection in June 2022 and she treated herself at home without any hospitalization. She continued to have shortness of breath after COVID she was seen by pulmonary disease associate at [address] Currently followed by Levine, Susan. \[facility]"" cardiology for hypertension. She has other symptoms suggestive of possible collagen vascular disease including either rheumatoid arthritis or Sjogren's disease. She described issue with a dry mouth and dry eyes. Dr. Titus Anderson at [facility] as well. We discussed that need to reevaluate her sleep by doing in lab study to determine the status of her sleep apnea and proceed with the proper treatment option.   I indicated to her that trial of CPAP is considered first option especially has a machine at home and this will require CPAP teaching and acclamation. She will need another DME provider to help in this regard. The [place] score today 11 out of 24.     Sleepiness Scale:     Sitting and reading 2    Watching TV 3    Sitting inactive in a public place 0    As a passenger in a car for an hour without a break 3    Lying down to rest in the afternoon when circumstances Permits 3    Sitting and talking to someone 0    Sitting quietly after lunch without alcohol 0    In a car, while stopped for a few minutes 0    Total :  11/24    Past Medical History:   Diagnosis Date    Arthritis     Diabetes ([facility] [address]     GERD (gastroesophageal reflux disease)     Hypertension     KENNA (obstructive sleep apnea) 5/28/2020    Psychiatric disorder        Patient Active Problem List   Diagnosis    Abnormal EKG    COVID-19    Abnormal finding on cardiovascular stress test    Other chest pain    SOB (shortness of breath)    Tachycardia    KENNA (obstructive sleep apnea)    Morbid obesity ([address]    Essential hypertension, benign    Bradycardia    Palpitation    Type 2 diabetes mellitus ([facility] [address]    Pure hypercholesterolemia    Daytime sleepiness    Diastolic dysfunction without heart failure       Past Surgical History:   Procedure Laterality Date    CHOLECYSTECTOMY      GI      EGD    TUBAL LIGATION         [unfilled]    Social History     Socioeconomic History    Marital status:      Spouse name: Not on file    Number of children: Not on file    Years of education: Not on file    Highest education level: Not on file   Occupational History    Not on file   Tobacco Use    Smoking status: Never    Smokeless tobacco: Never   Substance and Sexual Activity    Alcohol use: Yes    Drug use: No    Sexual activity: Not on file   Other Topics Concern    Not on file   Social History Narrative    Not on file     Social Determinants of Health     Financial Resource Strain: Not on file   Food Insecurity: Not on file   Transportation Needs: Not on file   Physical Activity: Not on file   Stress: Not on file   Social Connections: Not on file   Intimate Partner Violence: Not on file   Housing Stability: Not on file         History reviewed. No pertinent family history. Allergies   Allergen Reactions    Pravastatin Anaphylaxis    Levofloxacin Other (See Comments)     Headaches         Current Outpatient Medications   Medication Sig    empagliflozin (JARDIANCE) 10 MG tablet Take 1 tablet by mouth daily    isosorbide mononitrate (IMDUR) 30 MG extended release tablet Take 1 tablet by mouth daily    nitroGLYCERIN (NITROSTAT) 0.4 MG SL tablet Place 1 tablet under the tongue every 5 minutes as needed for Chest pain    nebivolol (BYSTOLIC) 10 MG tablet Take 1 tablet by mouth in the morning. glipiZIDE (GLUCOTROL XL) 2.5 MG extended release tablet Take 2.5 mg by mouth in the morning. ALPRAZolam (XANAX) 0.25 MG tablet Take 0.25 mg by mouth 2 times daily. betamethasone dipropionate 0.05 % lotion Apply topically 2 times daily    vitamin D (CHOLECALCIFEROL) 25 MCG (1000 UT) TABS tablet Take by mouth daily    hydroCHLOROthiazide (HYDRODIURIL) 12.5 MG tablet Take 12.5 mg by mouth daily    Levothyroxine Sodium 137 MCG/ML SOLN Take by mouth every morning (before breakfast)    metFORMIN (GLUCOPHAGE) 1000 MG tablet Take by mouth 2 times daily (with meals)    pantoprazole (PROTONIX) 40 MG tablet Take 40 mg by mouth 2 times daily     No current facility-administered medications for this visit. REVIEW OF SYSTEMS:   CONSTITUTIONAL:   There is no history of fever, chills, night sweats, weight loss, weight gain, persistent fatigue, or lethargy/hypersomnolence. EYES:   Denies problems with eye pain, erythema, blurred vision, or visual field loss. ENTM:   Denies history of tinnitus, epistaxis, sore throat, hoarseness, or dysphonia. LYMPH:   Denies swollen glands.    CARDIAC:   No chest pain, pressure, discomfort, palpitations, orthopnea, murmurs, or edema. GI:   No dysphagia, heartburn reflux, nausea/vomiting, diarrhea, abdominal pain, or bleeding. :   Denies history of dysuria, hematuria, polyuria, or decreased urine output. MS:   No history of myalgias, arthralgias, bone pain, or muscle cramps. SKIN:   No history of rashes, jaundice, cyanosis, nodules, or ulcers. ENDO:   Negative for heat or cold intolerance. No history of DM. PSYCH:   Negative for anxiety, depression, insomnia, hallucinations. NEURO:   There is no history of AMS, persistent headache, decreased level of consciousness, seizures, or motor or sensory deficits. PHYSICAL EXAM:    Vitals:    11/15/22 1032   BP: (!) 156/100   Pulse: 72   Resp: 14   Temp: 97 °F (36.1 °C)   SpO2: 96%        GENERAL APPEARANCE:   The patient is normal weight and in no respiratory distress. HEENT:   PERRL. Conjunctivae unremarkable. Nasal mucosa is without epistaxis, exudate, or polyps. Gums and dentition are unremarkable. There is severe oropharyngeal narrowing. She is Mallampati 4     NECK/LYMPHATIC:   Symmetrical with no elevation of jugular venous pulsation. Trachea midline. No thyroid enlargement. No cervical adenopathy. LUNGS:   Normal respiratory effort with symmetrical lung expansion. Breath sounds are diminished in the bases. HEART:   There is a regular rate and rhythm. No murmur, rub, or gallop. There is no edema in the lower extremities. ABDOMEN:   Soft and non-tender. No hepatosplenomegaly. Bowel sounds are normal.     SKIN:   There are no rashes, cyanosis, jaundice, or ecchymosis present. EXTREMITIES:   The extremities are unremarkable without clubbing, cyanosis, joint inflammation, degenerative, or ischemic change. MUSCULOSKELETAL:   There is no abnormal tone, muscle atrophy, or abnormal movement present. NEURO:   The patient is alert and oriented to person, place, and time.   Memory appears intact and mood is normal.  No gross sensorimotor deficits are present. DIAGNOSTIC TESTS:   Echocardiogram 2/2/2022:      Left Ventricle: Left ventricle size is normal. Normal wall thickness. Normal wall motion. Normal left ventricular systolic function with a visually estimated EF of 55 - 60%. Diastolic dysfunction present with normal LV EF.    CXR 06/15/2022  - Impression: Similar mild linear opacities in the left lung base, likely representing atelectasis, with no new consolidation. ASSESSMENT:  (Medical Decision Making)         ICD-10-CM    1. KENNA (obstructive sleep apnea) , the severity of this is not known yet her previous sleep study is not available for review. We will proceed with an in lab sleep study to determine current status of sleep apnea and proceed with the optimal treatment plan    The pathophysiology of obstructive sleep apnea was reviewed with the patient. It's potential to promote severe neurologic, cardiac, pulmonary, and gastrointestinal problems was discussed. Specifically, the increased incidence of hypertension, coronary artery disease, congestive heart failure, pulmonary hypertension, gastroesophageal reflux, pathologic hypersomnolence, memory loss, and glucose intolerance was related to the consequences of hypoxemia, hypercapnia, airway obstruction, and sympathetic overdrive. We also discussed the ability of nasal CPAP to correct these abnormalities through maintenance of a patent airway. Therapeutic options including surgery, oral appliances, and weight loss were also reviewed. G47.33 Ambulatory Referral to Sleep Studies      2. Daytime sleepiness , this related to untreated sleep apnea R40.0 Ambulatory Referral to Sleep Studies      3. Morbid obesity ([facility] [address] , likely contributing to complexity of care E66.01       4. Essential hypertension, benign     Currently being managed by primary care physician and [facility] [place] [place] cardiology     I10       5.  Diastolic dysfunction without heart failure ,    She was restarted on Jardiance recently to help with management of her diabetes control and diastolic dysfunction T49.20              PLAN:    The patient will be scheduled for modified split study. Based on that we will restart treatment with her CPAP machine which currently she has but never used it previously due to some issue with claustrophobia from her mask    She was advised to follow proper sleep hygiene and positional therapy    She will be provided with appropriate sleep hygiene instruction and sleep education    She will maintain her follow-up with the primary care, rheumatology, cardiology    She will return to the sleep center in 3 months or sooner if needed    Will attempt to obtain her previous sleep study if possible    All questions and concerns were addressed properly to her satisfaction      Orders Placed This Encounter   Procedures    Ambulatory Referral to Sleep Studies     Referral Priority:   Urgent     Referral Type:   Consult for Advice and Opinion     Referral Reason:   Specialty Services Required     Number of Visits Requested:   1               No orders of the defined types were placed in this encounter. Over 50% of today's office visit was spent in face to face time reviewing test results, prognosis, importance of compliance, education about disease process, benefits of medications, instructions for management of acute flare-ups, and follow up plans. Total face to face time spent with patient and charting was 48 minutes.     Zhang Lee MD  Electronically signed

## 2022-11-15 ENCOUNTER — OFFICE VISIT (OUTPATIENT)
Dept: SLEEP MEDICINE | Age: 57
End: 2022-11-15
Payer: COMMERCIAL

## 2022-11-15 VITALS
RESPIRATION RATE: 14 BRPM | OXYGEN SATURATION: 96 % | SYSTOLIC BLOOD PRESSURE: 156 MMHG | HEART RATE: 72 BPM | HEIGHT: 66 IN | DIASTOLIC BLOOD PRESSURE: 100 MMHG | WEIGHT: 281 LBS | TEMPERATURE: 97 F | BODY MASS INDEX: 45.16 KG/M2

## 2022-11-15 DIAGNOSIS — R40.0 DAYTIME SLEEPINESS: ICD-10-CM

## 2022-11-15 DIAGNOSIS — I10 ESSENTIAL HYPERTENSION, BENIGN: ICD-10-CM

## 2022-11-15 DIAGNOSIS — G47.33 OSA (OBSTRUCTIVE SLEEP APNEA): Primary | ICD-10-CM

## 2022-11-15 DIAGNOSIS — I51.89 DIASTOLIC DYSFUNCTION WITHOUT HEART FAILURE: ICD-10-CM

## 2022-11-15 DIAGNOSIS — E66.01 MORBID OBESITY (HCC): ICD-10-CM

## 2022-11-15 PROCEDURE — 99204 OFFICE O/P NEW MOD 45 MIN: CPT | Performed by: INTERNAL MEDICINE

## 2022-11-15 PROCEDURE — 3078F DIAST BP <80 MM HG: CPT | Performed by: INTERNAL MEDICINE

## 2022-11-15 PROCEDURE — 3074F SYST BP LT 130 MM HG: CPT | Performed by: INTERNAL MEDICINE

## 2022-11-15 NOTE — PATIENT INSTRUCTIONS
Sleep Hygiene Instructions    Sleep only as much as you need to feel refreshed during the following day. Restricting your time in bed helps to consolidate and deepen your sleep. Excessively long times in bed lead to fragmented and shallow sleep. Get up at your regular time the next day, no matter how little your slept. Get up at the same time each day, 7 days a week. A regular wake time in the morning leads to regular times on sleep onset, and helps to set your biological clock. Exercise regularly. Schedule exercise times so that they do not occur within 3 hours of when you intend to go to bed. Exercise makes it easier to initiate sleep and deepen sleep. Don't take your problems to bed. Plan some time earlier in the evening for working on your problems or planning the next day's activities. Worrying may interfere with initiating sleep and produce shallow sleep. Train yourself to use the bedroom only for sleep and sexual activity. This will help condition your brain to see bed as the place for sleeping. Do not read, watch TV or eat in bed. Do not try and fall asleep. This only makes the problem worse. Instead, turn on the light, leave the bedroom, and do something different like reading a book. Don't engage in stimulating activity. Return to bed only when you feel sleepy. Avoid long naps. Staying awake during the day helps to fall asleep at night. Naps totalling more than 30 minutes increase your chances of having trouble sleeping at night. Make sure that your bedroom is comfortable and free from light and noise. A comfortable, noise-free sleep environment will reduce the likelihood that you will wake up during the night. Noise that does not awaken you may disturb the quality of your sleep. Carpeting, insulated curtains, and closing the door may help. Make sure that your bedroom is at a comfortable temperature during the night.  Excessively warm or cold sleep environments may disturb sleep. Eat regular meals and di not go to bed hungry. Hunger may disturb sleep. A light snack at bedtime (especially carbohydrates) may help sleep, but avoid greasy or heavy foods. Avoid excessive liquids in the evening. Reducing liquid intake will minimize the need for night-time trips to the bathroom. Cut down on all caffeine products. Caffeinated beverages and food (Coffee, tea, cola, chocolate) can cause difficulty falling asleep, awakenings during the night, and shallow sleep. Even caffeine early in the day can disrupt night-time sleep. Avoid alcohol, especially in the evening. Although alcohol helps tense people fall asleep more easily, it causes awakenings later in the night. Smoking may disturb sleep. Nicotine is a stimulant. Try not to smoke during the night when you have trouble sleeping. Learning about Sleeping Well    What does sleeping well mean? Sleeping well means getting enough sleep. How much sleep is enough varies among people. The number of hours you sleep is not as improtant as how you feel when you wake up. If you do not feel refreshed, you probably need more sleep. Another sign of not getting enough sleep is feeling tired during the day. The average totally nightly sleep time is 7 1/2 to 8 hours. Healthy adults may need a little more or a little less than this. Why is getting enough sleep important? Getting enough quality sleep is a basic part of good health. When your sleep suffers, your mood and your thoughts can suffer too. Your thoughts can suffer too. You ma find yourself feeling more grumpy or stressed. No getting enough sleep also can lead to serious problems, including injury, accidents, anxiety, and depression. What might cause poor sleeping? Many things can cause sleep problems, including:    Stress. Stress can be caused by fear about a single event, such as giving a speech.   Or you may have ongoing stress, such as worry about work or school. Depression, anxiety, and other mental or emotional conditions. Changes in your sleep habits or surroundings. This includes changes that happen where you sleep, such as noise, light, or sleeping in a different bed. It also includes changes in your sleep pattern, such as having jet lab or working a late shift. Health problems, such as pain, breathing problems, and restless legs syndrome. Lack of regular exercise. How can you help yourself? Here are some tips that may help you sleep more soundly and wake up feeling more refreshed. Your sleeping area    Use your bedroom only for sleeping and sex. A bit of light reading may help you fall asleep. But if it doesn't, do your reading elsewhere in the house. Don't watch TV in bed. Be sure your bed is big enough to stretch out comfortable, especially if you have a sleep partner. Keep your bedroom quiet, dark, and cool. Use curtains, blinds, or sleep mask to block out light. To block out noise, use earplugs, soothing music, or a \"white noise\" machine. Your evening and bedtime routine    Create a relaxing bedtime routine. You might want to take a warm shower or bath, listen to soothing music, or drink a cup of non-caffeinated tea. Go to bed at the same time every night. And get up at the same time every morning, even if you feel tired. What to avoid:    Limit caffeine (coffee, tea, caffeinated sodas) during the day, and dont have any for at least 4 to 6 hours before bedtime. Don't drink alcohol before bedtime. Alcohol can cause you to wake up more often during the night. Don't smoke or use tobacco, especially in the evening. Nicotine can keep you awake. Don't like in bed away for too long. If you can't fall asleep, or if you wake up in the middle of the night and can't get back to sleep within 15 minutes or so, get out of bed and go to another room until you feel sleepy.     Don't take medicine right before bed that may keep you awake or make you feel hyper or enegerized. Your doctor can tell you if your medicine may do this and if you can take it earlier in the day. If you can't sleep:    Imagine yourself in a peaceful, pleasant scene. Focus on the details and feelings of being in a place that is relaxing. Get up and do a quiet or boring activity until you feel sleepy. Don't drink liquids after 6 p.m. if you wake up often because you have to go to the bathroom. Where can you learn more? Go to http://www.gray.com/    Enter C237 in the search box to learn more about \"Learning About Sleeping Well. \"           Learning About Sleeping Well  What does sleeping well mean? Sleeping well means getting enough sleep. How much sleep is enough varies among people. The number of hours you sleep is not as important as how you feel when you wake up. If you do not feel refreshed, you probably need more sleep. Another sign of not getting enough sleep is feeling tired during the day. The average total nightly sleep time is 7½ to 8 hours. Healthy adults may need a little more or a little less than this. Why is getting enough sleep important? Getting enough quality sleep is a basic part of good health. When your sleep suffers, your mood and your thoughts can suffer too. You may find yourself feeling more grumpy or stressed. Not getting enough sleep also can lead to serious problems, including injury, accidents, anxiety, and depression. What might cause poor sleeping? Many things can cause sleep problems, including:  Stress. Stress can be caused by fear about a single event, such as giving a speech. Or you may have ongoing stress, such as worry about work or school. Depression, anxiety, and other mental or emotional conditions. Changes in your sleep habits or surroundings. This includes changes that happen where you sleep, such as noise, light, or sleeping in a different bed.  It also includes changes in your sleep pattern, such as having jet lag or working a late shift. Health problems, such as pain, breathing problems, and restless legs syndrome. Lack of regular exercise. How can you help yourself? Here are some tips that may help you sleep more soundly and wake up feeling more refreshed. Your sleeping area   Use your bedroom only for sleeping and sex. A bit of light reading may help you fall asleep. But if it doesn't, do your reading elsewhere in the house. Don't watch TV in bed. Be sure your bed is big enough to stretch out comfortably, especially if you have a sleep partner. Keep your bedroom quiet, dark, and cool. Use curtains, blinds, or a sleep mask to block out light. To block out noise, use earplugs, soothing music, or a \"white noise\" machine. Your evening and bedtime routine   Get regular exercise, but not within 3 to 4 hours before your bedtime. Create a relaxing bedtime routine. You might want to take a warm shower or bath, listen to soothing music, or drink a cup of noncaffeinated tea. Go to bed at the same time every night. And get up at the same time every morning, even if you feel tired. What to avoid   Limit caffeine (coffee, tea, caffeinated sodas) during the day, and don't have any for at least 4 to 6 hours before bedtime. Don't drink alcohol before bedtime. Alcohol can cause you to wake up more often during the night. Don't smoke or use tobacco, especially in the evening. Nicotine can keep you awake. Don't take naps during the day, especially close to bedtime. Don't lie in bed awake for too long. If you can't fall asleep, or if you wake up in the middle of the night and can't get back to sleep within 15 minutes or so, get out of bed and go to another room until you feel sleepy. Don't take medicine right before bed that may keep you awake or make you feel hyper or energized.  Your doctor can tell you if your medicine may do this and if you can take it earlier in the day.  If you can't sleep   Imagine yourself in a peaceful, pleasant scene. Focus on the details and feelings of being in a place that is relaxing. Get up and do a quiet or boring activity until you feel sleepy. Don't drink any liquids after 6 p.m. if you wake up often because you have to go to the bathroom. Where can you learn more? Go to alike.be  Enter V237 in the search box to learn more about \"Learning About Sleeping Well. \"   © 6200-5363 Healthwise, Incorporated. Care instructions adapted under license by [facility] [employer] (which disclaims liability or warranty for this information). This care instruction is for use with your licensed healthcare professional. If you have questions about a medical condition or this instruction, always ask your healthcare professional. [address] any warranty or liability for your use of this information. Content Version: 77.7.771709; Current as of: March 12, 2014              Sleep Apnea: After Your Visit  Your Care Instructions  Sleep apnea means that you frequently stop breathing for 10 seconds or longer during sleep. It can be mild to severe, based on the number of times an hour that you stop breathing or have slowed breathing. Blocked or narrowed airways in your nose, mouth, or throat can cause sleep apnea. Your airway can become blocked when your throat muscles and tongue relax during sleep. You can treat sleep apnea at home by making lifestyle changes. You also can use a CPAP breathing machine that keeps tissues in the throat from blocking your airway. Or your doctor may suggest that you use a breathing device while you sleep. It helps keep your airway open. This could be a device that you put in your mouth. Other examples include strips or disks that you use on your nose. In some cases, surgery may be needed to remove enlarged tissues in the throat. Follow-up care is a key part of your treatment and safety.  Be sure to make and go to all appointments, and call your doctor if you are having problems. It's also a good idea to know your test results and keep a list of the medicines you take. How can you care for yourself at home? Lose weight, if needed. It may reduce the number of times you stop breathing or have slowed breathing. Sleep on your side. It may stop mild apnea. If you tend to roll onto your back, sew a pocket in the back of your pajama top. Put a tennis ball into the pocket, and stitch the pocket shut. This will help keep you from sleeping on your back. Avoid alcohol and medicines such as sleeping pills and sedatives before bed. Do not smoke. Smoking can make sleep apnea worse. If you need help quitting, talk to your doctor about stop-smoking programs and medicines. These can increase your chances of quitting for good. Prop up the head of your bed 4 to 6 inches by putting bricks under the legs of the bed. Treat breathing problems, such as a stuffy nose, caused by a cold or allergies. Try a continuous positive airway pressure (CPAP) breathing machine if your doctor recommends it. The machine keeps your airway open when you sleep. If CPAP does not work for you, ask your doctor if you can try other breathing machines. A bilevel positive airway pressure machine uses one type of air pressure for breathing in and another type for breathing out. Another device raises or lowers air pressure as needed while you breathe. Talk to your doctor if:  Your nose feels dry or bleeds when you use one of these machines. You may need to increase moisture in the air. A humidifier may help. Your nose is runny or stuffy from using a breathing machine. Decongestants or a corticosteroid nasal spray may help. You are sleepy during the day and it gets in the way of the normal things you do. Do not drive when you are drowsy. When should you call for help?   Watch closely for changes in your health, and be sure to contact your doctor if: You still have sleep apnea even though you have made lifestyle changes. You are thinking of trying a device such as CPAP. You are having problems using a CPAP or similar machine. Where can you learn more? Go to [employer].be  Enter J936 in the search box to learn more about \"Sleep Apnea: After Your Visit. \"   © 5785-1946 Healthwise, Incorporated. Care instructions adapted under license by [facility] (which disclaims liability or warranty for this information). This care instruction is for use with your licensed healthcare professional. If you have questions about a medical condition or this instruction, always ask your healthcare professional. [address] any warranty or liability for your use of this information. Content Version: 64.3.040971; Current as of: January 14, 2014                        Eating Healthy Foods: After Your Visit  Your Care Instructions  Eating healthy foods can help lower your risk for disease. Healthy food gives you energy and keeps your heart strong, your brain active, your muscles working, and your bones strong. A healthy diet includes a variety of foods from the basic food groups: grains, vegetables, fruits, milk and milk products, and meat and beans. Some people may eat more of their favorite foods from only one food group and, as a result, miss getting the nutrients they need. So, it is important to pay attention not only to what you eat but also to what you are missing from your diet. You can eat a healthy, balanced diet by making a few small changes. Follow-up care is a key part of your treatment and safety. Be sure to make and go to all appointments, and call your doctor if you are having problems. Its also a good idea to know your test results and keep a list of the medicines you take. How can you care for yourself at home? Look at what you eat  Keep a food diary for a week or two and record everything you eat or drink. Track the number of servings you eat from each food group. For a balanced diet every day, eat a variety of:  6 or more ounce-equivalents of grains, such as cereals, breads, crackers, rice, or pasta, every day. An ounce-equivalent is 1 slice of bread, 1 cup of ready-to-eat cereal, or ½ cup of cooked rice, cooked pasta, or cooked cereal.  2½ cups of vegetables, especially:  Dark-green vegetables such as broccoli and spinach. Orange vegetables such as carrots and sweet potatoes. Dry beans (such as barrera and kidney beans) and peas (such as lentils). 2 cups of fresh, frozen, or canned fruit. A small apple or 1 banana or orange equals 1 cup.  3 cups of nonfat or low-fat milk, yogurt, or other milk products. 5½ ounces of meat and beans, such as chicken, fish, lean meat, beans, nuts, and seeds. One egg, 1 tablespoon of peanut butter, ½ ounce nuts or seeds, or ¼ cup of cooked beans equals 1 ounce of meat. Learn how to read food labels for serving sizes and ingredients. Fast-food and convenience-food meals often contain few or no fruits or vegetables. Make sure you eat some fruits and vegetables to make the meal more nutritious. Look at your food diary. For each food group, add up what you have eaten and then divide the total by the number of days. This will give you an idea of how much you are eating from each food group. See if you can find some ways to change your diet to make it more healthy. Start small  Do not try to make dramatic changes to your diet all at once. You might feel that you are missing out on your favorite foods and then be more likely to fail. Start slowly, and gradually change your habits. Try some of the following:  Use whole wheat bread instead of white bread. Use nonfat or low-fat milk instead of whole milk. Eat brown rice instead of white rice, and eat whole wheat pasta instead of white-flour pasta. Try low-fat cheeses and low-fat yogurt.   Add more fruits and vegetables to meals and have them for snacks. Add lettuce, tomato, cucumber, and onion to sandwiches. Add fruit to yogurt and cereal.  Enjoy food  You can still eat your favorite foods. You just may need to eat less of them. If your favorite foods are high in fat, salt, and sugar, limit how often you eat them, but do not cut them out entirely. Eat a wide variety of foods. Make healthy choices when eating out  The type of restaurant you choose can help you make healthy choices. Even fast-food chains are now offering more low-fat or healthier choices on the menu. Choose smaller portions, or take half of your meal home. When eating out, try:  A veggie pizza with a whole wheat crust or grilled chicken (instead of sausage or pepperoni). Pasta with roasted vegetables, grilled chicken, or marinara sauce instead of cream sauce. A vegetable wrap or grilled chicken wrap. Broiled or poached food instead of fried or breaded items. Make healthy choices easy  Buy packaged, prewashed, ready-to-eat fresh vegetables and fruits, such as baby carrots, salad mixes, and chopped or shredded broccoli and cauliflower. Buy packaged, presliced fruits, such as melon or pineapple. Choose 100% fruit or vegetable juice instead of soda. Limit juice intake to 4 to 6 oz (½ to ¾ cup) a day. Blend low-fat yogurt, fruit juice, and canned or frozen fruit to make a smoothie for breakfast or a snack. Where can you learn more? Go to [employer]be  Enter T756 in the search box to learn more about \"Eating Healthy Foods: After Your Visit. \"   © 7977-7327 Healthwise, Incorporated. Care instructions adapted under license by [facility] (which disclaims liability or warranty for this information).  This care instruction is for use with your licensed healthcare professional. If you have questions about a medical condition or this instruction, always ask your healthcare professional. [facility] [address] any warranty or liability for your use of this information. Content Version: 10.1.974084; Current as of: May 17, 2013                                    Learning About Physical Activity  What is physical activity? Physical activity is any kind of activity that gets your body moving. Being active means allowing your body to \"practice\" breathing, stretching, and lifting. The more practice your body gets, the better it works. The types of physical activity that can help you get fit and stay healthy include:  Aerobic or \"cardio\" activities that make your heart beat faster and make you breathe harder, such as brisk walking, riding a bike, or running. Aerobic activities strengthen your heart and lungs and build up your endurance. Strength training activities that make your muscles work against, or \"resist,\" something, such as lifting weights or doing push-ups. These activities help tone and strengthen your muscles. Stretches that allow you to move your joints and muscles through their full range of motion. Stretching helps you be more flexible and avoid injury. What are the benefits of physical activity? Being active is one of the best things you can do to get fit and stay healthy. It helps you to:  Feel stronger and have more energy to do all the things you like to do. Focus better at school or work and perform better in sports. Feel, think, and sleep better. Reach and stay at a healthy weight. Lose fat and build lean muscle. Lower your risk for serious health problems. Keep your bones, muscles, and joints strong. Being fit lets you do more physical activity. And it lets you work out harder without as much effort. How can you make physical activity part of your life? Get at least 30 minutes of exercise on most days of the week. Walking is a good choice. You also may want to do other activities, such as running, swimming, cycling, or playing tennis or team sports.   Pick activities that you like--ones that make your heart beat faster, your muscles stronger, and your muscles and joints more flexible. If you find more than one thing you like doing, do them all. You don't have to do the same thing every day. Get your heart pumping every day. Any activity that makes your heart beat faster and keeps it at that rate for a while counts. Here are some great ways to get your heart beating faster:  Go for a brisk walk, run, or bike ride. Go for a hike or swim. Go in-line skating. Play a game of touch football, basketball, or soccer. Ride a bike. Play tennis or racquetball. Climb stairs. Even some household chores can be aerobic--just do them at a faster pace. Vacuuming, raking or mowing the lawn, sweeping the garage, and washing and waxing the car all can help get your heart rate up. Strengthen your muscles during the week. You don't have to lift heavy weights or grow big, bulky muscles to get stronger. Doing a few simple activities that make your muscles work against, or \"resist,\" something can help you get stronger. For example, you can:  Do push-ups or sit-ups, which use your own body weight as resistance. Lift weights or dumbbells or use stretch bands at home or in a gym or community center. Stretch your muscles often. Stretching will help you as you become more active. It can help you stay flexible, loosen tight muscles, and avoid injury. It can also help improve your balance and posture and can be a great way to relax. Be sure to stretch the muscles you'll be using when you work out. Its best to warm your muscles slightly before you stretch them. Walk or do some other light aerobic activity for a few minutes, and then start stretching. When you stretch your muscles:  Do it slowly. Stretching is not about going fast or making sudden movements. Don't push or bounce during a stretch. Hold each stretch for at least 15 to 30 seconds, if you can. You should feel a stretch in the muscle, but not pain. Breathe out as you do the stretch.  Then breathe in as you hold the stretch. Don't hold your breath. If you're worried about how more activity might affect your health, have a checkup before you start. Follow any special advice your doctor gives you for getting a smart start. Where can you learn more? Go to [employer].be  Enter [id number] in the search box to learn more about \"Learning About Physical Activity. \"   © 4180-8015 [employer]. Care instructions adapted under license by [employer] (which disclaims liability or warranty for this information). This care instruction is for use with your licensed healthcare professional. If you have questions about a medical condition or this instruction, always ask your healthcare professional. [address] any warranty or liability for your use of this information. Content Version: 56.9.047820; Current as of: November 15, 2013                                          Learning About CPAP for Sleep Apnea  What is CPAP? CPAP is a small machine that you use at home every night while you sleep. It increases air pressure in your throat to keep your airway open. When you have sleep apnea, this can help you sleep better so you feel much better. CPAP stands for \"continuous positive airway pressure. \"  The CPAP machine will have one of the following:  A mask that covers your nose and mouth  Prongs that fit into your nose  A mask that covers your nose only, the most common type. This type is called NCPAP. The N stands for \"nasal.\"  Why is it done? CPAP is usually the best treatment for obstructive sleep apnea. It is the first treatment choice and the most widely used. Your doctor may suggest CPAP if you have: Moderate to severe sleep apnea. Sleep apnea and coronary artery disease (CAD) or heart failure. How does it help? CPAP can help you have more normal sleep, so you feel less sleepy and more alert during the daytime.   CPAP may help keep heart failure or other heart problems from getting worse. CPAP may help lower your blood pressure. If you use CPAP, your bed partner may also sleep better because you are not snoring or restless. What are the side effects? Some people who use CPAP have:  A dry or stuffy nose and a sore throat. Irritated skin on the face. Sore eyes. Bloating. If you have any of these problems, work with your doctor to fix them. Here are some things you can try:  Be sure the mask or nasal prongs fit well. See if your doctor can adjust the pressure of your CPAP. If your nose is dry, try a humidifier. If your nose is runny or stuffy, try decongestant medicine or a steroid nasal spray. Be safe with medicines. Read and follow all instructions on the label. Do not use the medicine longer than the label says. If these things do not help, you might try a different type of machine. Some machines have air pressure that adjusts on its own. Others have air pressures that are different when you breathe in than when you breathe out. This may reduce discomfort caused by too much pressure in your nose. Where can you learn more? Go to [employer].be  Enter Celeste Moyer in the search box to learn more about \"Learning About CPAP for Sleep Apnea. \"   © 8016-3240 Healthwise, Incorporated. Care instructions adapted under license by [facility] (which disclaims liability or warranty for this information). This care instruction is for use with your licensed healthcare professional. If you have questions about a medical condition or this instruction, always ask your healthcare professional. [place] [address] any warranty or liability for your use of this information.   Content Version: 96.1.066394; Current as of: January 24, 2014

## 2023-01-10 ENCOUNTER — HOSPITAL ENCOUNTER (OUTPATIENT)
Dept: SLEEP MEDICINE | Age: 58
Discharge: HOME | End: 2023-01-13
Payer: COMMERCIAL

## 2023-01-10 PROCEDURE — 95810 POLYSOM 6/> YRS 4/> PARAM: CPT

## 2023-01-23 ENCOUNTER — CLINICAL DOCUMENTATION (OUTPATIENT)
Dept: SLEEP MEDICINE | Age: 58
End: 2023-01-23

## 2023-01-23 NOTE — PROGRESS NOTES
Patient had recent sleep study that showed sleep apnea and hypoxemia. AHI- 7.1     Lowest SaO2- 78%. Per Interp patient will need a cpap titration. Sleep lab will call the patient to schedule titration or the patient can call the sleep lab at [phone] to schedule.

## 2023-01-24 ENCOUNTER — TELEPHONE (OUTPATIENT)
Dept: CARDIOLOGY CLINIC | Age: 58
End: 2023-01-24

## 2023-01-24 DIAGNOSIS — E66.01 MORBID OBESITY (HCC): ICD-10-CM

## 2023-01-24 DIAGNOSIS — R00.2 PALPITATION: ICD-10-CM

## 2023-01-24 DIAGNOSIS — I51.89 DIASTOLIC DYSFUNCTION WITHOUT HEART FAILURE: ICD-10-CM

## 2023-01-24 DIAGNOSIS — R00.0 TACHYCARDIA: Primary | ICD-10-CM

## 2023-01-24 DIAGNOSIS — R42 LIGHT HEADED: ICD-10-CM

## 2023-01-24 DIAGNOSIS — R00.1 BRADYCARDIA: ICD-10-CM

## 2023-01-24 DIAGNOSIS — R00.2 PALPITATIONS: ICD-10-CM

## 2023-01-24 NOTE — TELEPHONE ENCOUNTER
MD Seymour Zamora, LPN  Caller: Unspecified (Today, 11:31 AM)  I have an appointment but ok to have her fitted for a monitor--depending on how frequent palpitations are would do at least a week, 30 days if not having symptoms most days, check her A1C having been very poorly controlled per Dr Tan Suggs note in September,  TSH, BMP And Mg if not done within the last 3 months, is she wearing her CPAP (I see she has follow up with them in ~ 3 weeks) normal coronaries at [facility] within the last 4 years           Informed pt of Dr. Rodriguez Reed response. Appt tomorrow 11a [facility] Nurse Fadi for monitor. Can get labs on 1st floor before or after. FU appt 2/15/23 10:30a Dr. Quentin Webber, . Pt voiced understanding. Confirms appts. Willing to go to , just has to allow extra time. Pt states had sleep study and going for FU in 3 wks. Not currently wearing CPAP. Intolerant in the past. Pt states sleeping better since taking Ozempic. Only gets up 1x/noc to void. Palpitations almost every day, not sure every day. [facility]  Orders Placed This Encounter   Procedures    Hemoglobin A1C     Standing Status:   Future     Standing Expiration Date:   1/24/2024    TSH     Standing Status:   Future     Standing Expiration Date:   3/91/5953    Basic Metabolic Panel     Standing Status:   Future     Standing Expiration Date:   1/24/2024    Magnesium     Standing Status:   Future     Standing Expiration Date:   1/24/2024     2 wk Monitor ordered.   [facility]

## 2023-01-24 NOTE — TELEPHONE ENCOUNTER
From: Akosua Cheung   Sent: 1/24/2023  10:35 AM EST   To: , *   Subject: Appointment Request                                Appointment Request From: Akosua Cheung      With Provider: MD Daija Moffett CARDIOLOGY]      Preferred Date Range: 1/26/2023 - 1/26/2023      Preferred Times: Any Time      Reason for visit: Request an Appointment      Comments:   Heart racing beating hard fatigue      Pt c/o intermittent HR racing, ~105 bpm. Light headed, Sx come and go. Previous Dr. Best Sadler pt. Hx Sleep Apnea, Obesity, Type 2 DM, HTN, Hypercholesteremia, Hypothyroidism. States thyroid med adjusted 2 mos ago. She will check with PCP re repeating thyroid labs. Lives in [facility]. Requests appt before 1p due to picking up kids at school.  cgh

## 2023-01-25 DIAGNOSIS — E66.01 MORBID OBESITY (HCC): ICD-10-CM

## 2023-01-25 DIAGNOSIS — R00.0 TACHYCARDIA: ICD-10-CM

## 2023-01-25 DIAGNOSIS — I51.89 DIASTOLIC DYSFUNCTION WITHOUT HEART FAILURE: ICD-10-CM

## 2023-01-25 DIAGNOSIS — R00.1 BRADYCARDIA: ICD-10-CM

## 2023-01-25 DIAGNOSIS — R00.2 PALPITATION: ICD-10-CM

## 2023-01-25 DIAGNOSIS — R42 LIGHT HEADED: ICD-10-CM

## 2023-01-25 DIAGNOSIS — R00.2 PALPITATIONS: ICD-10-CM

## 2023-01-25 LAB
ANION GAP SERPL CALC-SCNC: 11 MMOL/L (ref 2–11)
BUN SERPL-MCNC: 10 MG/DL (ref 6–23)
CALCIUM SERPL-MCNC: 9.7 MG/DL (ref 8.3–10.4)
CHLORIDE SERPL-SCNC: 106 MMOL/L (ref 101–110)
CO2 SERPL-SCNC: 26 MMOL/L (ref 21–32)
CREAT SERPL-MCNC: 0.7 MG/DL (ref 0.6–1)
GLUCOSE SERPL-MCNC: 77 MG/DL (ref 65–100)
MAGNESIUM SERPL-MCNC: 2 MG/DL (ref 1.8–2.4)
POTASSIUM SERPL-SCNC: 3.9 MMOL/L (ref 3.5–5.1)
SODIUM SERPL-SCNC: 143 MMOL/L (ref 133–143)
TSH, 3RD GENERATION: 0.18 UIU/ML (ref 0.36–3.74)

## 2023-01-26 ENCOUNTER — TELEPHONE (OUTPATIENT)
Dept: CARDIOLOGY CLINIC | Age: 58
End: 2023-01-26

## 2023-01-26 LAB
EST. AVERAGE GLUCOSE BLD GHB EST-MCNC: 140 MG/DL
HBA1C MFR BLD: 6.5 % (ref 4.8–5.6)
T4 FREE SERPL-MCNC: 1.5 NG/DL (ref 0.78–1.46)

## 2023-01-26 NOTE — TELEPHONE ENCOUNTER
Add on order faxed to lab. Pt made aware of results. States HbA1C is coming down, it was >12. Encouraged to f/u with PCP regarding thyroid studies. Verb understanding.

## 2023-01-26 NOTE — TELEPHONE ENCOUNTER
Received  attending physician statement from [facility],put in [facility] folder  to go to his box.

## 2023-01-26 NOTE — TELEPHONE ENCOUNTER
----- Message from Scott Lopez MD sent at 1/26/2023  7:32 AM EST -----  TSH abnormal. Add on free T4 and follow up with PCP on that please. Could be causing many of her symptoms.  A1C is much better than last time, well done

## 2023-01-27 ENCOUNTER — TELEPHONE (OUTPATIENT)
Dept: CARDIOLOGY CLINIC | Age: 58
End: 2023-01-27

## 2023-01-27 NOTE — TELEPHONE ENCOUNTER
Documents from [facility], given to [employer]. (Placed in her office at her computer).

## 2023-01-27 NOTE — TELEPHONE ENCOUNTER
----- Message from Delberta Rinne, MD sent at 1/27/2023  7:32 AM EST -----  Confirms thyroid is mildly abnormal  please have her follow up with PCP to determine correct approach on this. Looks like they need to reduce her meds but since they manage it will leave it to them.  thanks

## 2023-01-27 NOTE — TELEPHONE ENCOUNTER
Advised patient of lab results and Dr. Mendy Wen response. Patient verbalized understanding. She states nurse called with Dr. Moreland Staff response, yesterday. She states she will send MyChart message to PCP requesting recommendations.

## 2023-02-13 NOTE — PROGRESS NOTES
[address], [address]  PHONE: [phone]      02/15/23    NAME:  Bayron Hernandez  : 1965  MRN: [id number]         SUBJECTIVE:   Bayron Hernandez is a 61 y.o. female seen for a follow up visit regarding the following:     Chief Complaint   Patient presents with    New Patient    Results     monitor              HPI:  Follow up  New Patient and Results (monitor)   . New to me, has seen both Elizabeth Payne in the past with dyspnea, morbid obesity, chest pain, palpitations, multiple negative monitors most recently last month. Normal coronaries at cath within the last 5 years. Advised her thyroid labs were mildly abnormal with free T4 1.5, she was to contact her managing provider for dosing recommendations on synthroid. Currently undergoing workup for mildly elevated sed rate    She's actually had CPAP for years but tolerates it poorly d/t claustrophobia, will be seeing sleep center soon to review options. She doesn't get much physical activity, citing joint pain, sometimes will walk at [facility] holding on to a cart. She's lost nearly 40 lbs so far with Ozempic. PAST CARDIAC HISTORY:  2018       [facility] - normal coronary arteries, normal LVEDP  ,  - ambulatory monitors without significant arrhythmia. Mild bradycardia during sleep - referred for sleep study         Probably normal stress echo (images limited)  2022       EF 55-60%, abn DF, normal valves  2023       14 day monitor - normal tracing, one symptom of fatigue entered with NSR 80        Abnormal sleep study AHI 7 with desaturation - CPAP ordered          Key CAD CHF Meds            nebivolol (BYSTOLIC) 10 MG tablet (Taking)    Sig - Route:  Take 1 tablet by mouth in the morning. - Oral    hydroCHLOROthiazide (HYDRODIURIL) 12.5 MG tablet (Taking)    Class: Historical Med          Key Antihyperglycemic Medications            Semaglutide (OZEMPIC, 1 MG/DOSE, SC) (Taking) Class: Historical Med    glipiZIDE (GLUCOTROL XL) 2.5 MG extended release tablet (Taking)    Class: Historical Med    metFORMIN (GLUCOPHAGE) 1000 MG tablet (Taking)    Class: Historical Med                Past Medical History, Past Surgical History, Family history, Social History, and Medications were all reviewed with the patient today and updated as necessary. Prior to Admission medications    Medication Sig Start Date End Date Taking? Authorizing Provider   levothyroxine (SYNTHROID) 125 MCG tablet  1/30/23  Yes Historical Provider, MD   Semaglutide (OZEMPIC, 1 MG/DOSE, SC) Inject into the skin   Yes Historical Provider, MD   nitroGLYCERIN (NITROSTAT) 0.4 MG SL tablet Place 1 tablet under the tongue every 5 minutes as needed for Chest pain 9/21/22  Yes Gael Mejia MD   nebivolol (BYSTOLIC) 10 MG tablet Take 1 tablet by mouth in the morning. 8/1/22  Yes Gael Mejia MD   glipiZIDE (GLUCOTROL XL) 2.5 MG extended release tablet Take 2.5 mg by mouth in the morning. 6/22/22  Yes Historical Provider, MD   ALPRAZolam Cindia Muss) 0.25 MG tablet Take 0.25 mg by mouth 2 times daily.    Yes Ar Automatic Reconciliation   betamethasone dipropionate 0.05 % lotion Apply topically 2 times daily   Yes Ar Automatic Reconciliation   vitamin D (CHOLECALCIFEROL) 25 MCG (1000 UT) TABS tablet Take by mouth daily   Yes Ar Automatic Reconciliation   hydroCHLOROthiazide (HYDRODIURIL) 12.5 MG tablet Take 12.5 mg by mouth daily   Yes Ar Automatic Reconciliation   metFORMIN (GLUCOPHAGE) 1000 MG tablet Take by mouth 2 times daily (with meals)   Yes Ar Automatic Reconciliation   pantoprazole (PROTONIX) 40 MG tablet Take 40 mg by mouth 2 times daily   Yes Ar Automatic Reconciliation   Levothyroxine Sodium 137 MCG/ML SOLN Take by mouth every morning (before breakfast)  Patient not taking: Reported on 2/15/2023    Ar Automatic Reconciliation     Allergies   Allergen Reactions    Bactrim [Sulfamethoxazole-Trimethoprim] Hives    Pravastatin Anaphylaxis    Levofloxacin Other (See Comments)     Headaches     Past Medical History:   Diagnosis Date    Arthritis     Diabetes ([address]     GERD (gastroesophageal reflux disease)     Hypertension     KENNA (obstructive sleep apnea) 5/28/2020    Psychiatric disorder      Past Surgical History:   Procedure Laterality Date    CHOLECYSTECTOMY      GI      EGD    TUBAL LIGATION       History reviewed. No pertinent family history. Social History     Tobacco Use    Smoking status: Never    Smokeless tobacco: Never   Substance Use Topics    Alcohol use: Yes       ROS:    Review of Systems   Constitutional: Positive for malaise/fatigue. Musculoskeletal:  Positive for joint pain. PHYSICAL EXAM:   /70   Pulse 82   Ht 5' 6\" (1.676 m)   Wt 270 lb 3.2 oz (122.6 kg) Comment: with shoes  BMI 43.61 kg/m²      Wt Readings from Last 3 Encounters:   02/15/23 270 lb 3.2 oz (122.6 kg)   11/15/22 281 lb (127.5 kg)   09/28/22 288 lb 12.8 oz (131 kg)     BP Readings from Last 3 Encounters:   02/15/23 106/70   11/15/22 (!) 156/100   09/28/22 126/70     Pulse Readings from Last 3 Encounters:   02/15/23 82   11/15/22 72   09/28/22 72           Physical Exam  Vitals reviewed. Medical problems and test results were reviewed with the patient today.      DATA REVIEW    Lab Results   Component Value Date    LSZ8RGY 0.182 (L) 01/25/2023 1/25/23 1145 1/26/22 1012   T4 Free 0.78 - 1.46 NG/DL 1.5 High       1/25/23 1145 1/26/22 1012   T4 Free 0.78 - 1.46 NG/DL 1.5 High       BMP  Lab Results   Component Value Date/Time     01/25/2023 11:45 AM    K 3.9 01/25/2023 11:45 AM     01/25/2023 11:45 AM    CO2 26 01/25/2023 11:45 AM    BUN 10 01/25/2023 11:45 AM    CREATININE 0.70 01/25/2023 11:45 AM    GLUCOSE 77 01/25/2023 11:45 AM    CALCIUM 9.7 01/25/2023 11:45 AM       1/25/23 1145     Hemoglobin A1C 4.8 - 5.6 % 6.5 High       Lab Results   Component Value Date/Time    MG 2.0 01/25/2023 11:45 AM      11/7/22 1140 Vitamin D, 1,25-OH, Total pg/mL 29       8/29/22 1028    Cholesterol 100 - 199 mg/dL 206 High     Triglycerides 0 - 149 mg/dL 197 High     HDL Cholesterol >39 mg/dL 34 Low     VLDL Cholesterol 5 - 40 mg/dL 36    LDL, Calculated 0 - 99 mg/dL 136 High     Chol/HDL Ratio 0.0 - 4.4 ratio 6.1 High         EKG        CXR/IMAGING        DEVICE INTERROGATION        OUTSIDE RECORDS REVIEW    Records from outside providers have been reviewed and summarized as noted in the HPI, past history and data review sections of this note, and reviewed with patient. .       ASSESSMENT and Zachery Baires was seen today for new patient and results. Diagnoses and all orders for this visit:    Palpitations    Shortness of breath    Morbid obesity ([address]    Diastolic dysfunction without heart failure    KENNA (obstructive sleep apnea)        IMPRESSION:    No significant arrhythmia, patient reassured    Weight coming down with Ozempic, discussed mediterranean/anti-inflammatory diet, mix up exercise to limit overstressing same joints    Consider bariatric program with ongoing comorbidities including inflammatory joint pain and sleep apnea? BP well controlled    Recommend managing cholesterol with lifestyle modification, consider omega 3 supplement        Return if symptoms worsen or fail to improve. Thank you for allowing me to participate in this patient's care. Please call or contact me if there are any questions or concerns regarding the above.       Seth Acharya MD  02/15/23  10:46 AM

## 2023-02-15 ENCOUNTER — OFFICE VISIT (OUTPATIENT)
Dept: CARDIOLOGY CLINIC | Age: 58
End: 2023-02-15
Payer: COMMERCIAL

## 2023-02-15 VITALS
SYSTOLIC BLOOD PRESSURE: 106 MMHG | HEART RATE: 82 BPM | WEIGHT: 270.2 LBS | DIASTOLIC BLOOD PRESSURE: 70 MMHG | BODY MASS INDEX: 43.42 KG/M2 | HEIGHT: 66 IN

## 2023-02-15 DIAGNOSIS — E66.01 MORBID OBESITY (HCC): ICD-10-CM

## 2023-02-15 DIAGNOSIS — R00.2 PALPITATIONS: Primary | ICD-10-CM

## 2023-02-15 DIAGNOSIS — R06.02 SHORTNESS OF BREATH: ICD-10-CM

## 2023-02-15 DIAGNOSIS — I51.89 DIASTOLIC DYSFUNCTION WITHOUT HEART FAILURE: ICD-10-CM

## 2023-02-15 DIAGNOSIS — G47.33 OSA (OBSTRUCTIVE SLEEP APNEA): ICD-10-CM

## 2023-02-15 PROCEDURE — 3078F DIAST BP <80 MM HG: CPT | Performed by: INTERNAL MEDICINE

## 2023-02-15 PROCEDURE — 99213 OFFICE O/P EST LOW 20 MIN: CPT | Performed by: INTERNAL MEDICINE

## 2023-02-15 PROCEDURE — 3074F SYST BP LT 130 MM HG: CPT | Performed by: INTERNAL MEDICINE

## 2023-02-15 RX ORDER — LEVOTHYROXINE SODIUM 0.12 MG/1
TABLET ORAL
COMMUNITY
Start: 2023-01-30

## 2023-02-15 NOTE — PATIENT INSTRUCTIONS
Patient Education        Learning About the Mediterranean Diet  What is the [address] [facility]? The Mediterranean diet is a style of eating rather than a diet plan. It features foods eaten in [place], [place], [place] and [place], and other countries along the [facility]. It emphasizes eating foods like fish, fruits, vegetables, beans, high-fiber breads and whole grains, nuts, and olive oil. This style of eating includes limited red meat, cheese, and sweets. Why choose the Mediterranean diet? A Mediterranean-style diet may improve heart health. It contains more fat than other heart-healthy diets. But the fats are mainly from nuts, unsaturated oils (such as fish oils and olive oil), and certain nut or seed oils (such as canola, soybean, or flaxseed oil). These fats may help protect the heart and blood vessels. How can you get started on the Mediterranean diet? Here are some things you can do to switch to a more Mediterranean way of eating. What to eat  Eat a variety of fruits and vegetables each day, such as grapes, blueberries, tomatoes, broccoli, peppers, figs, olives, spinach, eggplant, beans, lentils, and chickpeas. Eat a variety of whole-grain foods each day, such as oats, brown rice, and whole wheat bread, pasta, and couscous. Eat fish at least 2 times a week. Try tuna, salmon, mackerel, lake trout, herring, or sardines. Eat moderate amounts of low-fat dairy products, such as milk, cheese, or yogurt. Eat moderate amounts of poultry and eggs. Choose healthy (unsaturated) fats, such as nuts, olive oil, and certain nut or seed oils like canola, soybean, and flaxseed. Limit unhealthy (saturated) fats, such as butter, palm oil, and coconut oil. And limit fats found in animal products, such as meat and dairy products made with whole milk. Try to eat red meat only a few times a month in very small amounts. Limit sweets and desserts to only a few times a week.  This includes sugar-sweetened drinks like soda.  The Mediterranean diet may also include red wine with your meal--1 glass each day for women and up to 2 glasses a day for men.  Tips for eating at home  Use herbs, spices, garlic, lemon zest, and citrus juice instead of salt to add flavor to foods.  Add avocado slices to your sandwich instead of mckeon.  Have fish for lunch or dinner instead of red meat. Brush the fish with olive oil, and broil or grill it.  Sprinkle your salad with seeds or nuts instead of cheese.  Cook with olive or canola oil instead of butter or oils that are high in saturated fat.  Switch from 2% milk or whole milk to 1% or fat-free milk.  Dip raw vegetables in a vinaigrette dressing or hummus instead of dips made from mayonnaise or sour cream.  Have a piece of fruit for dessert instead of a piece of cake. Try baked apples, or have some dried fruit.  Tips for eating out  Try broiled, grilled, baked, or poached fish instead of having it fried or breaded.  Ask your  to have your meals prepared with olive oil instead of butter.  Order dishes made with marinara sauce or sauces made from olive oil. Avoid sauces made from cream or mayonnaise.  Choose whole-grain breads, whole wheat pasta and pizza crust, brown rice, beans, and lentils.  Cut back on butter or margarine on bread. Instead, you can dip your bread in a small amount of olive oil.  Ask for a side salad or grilled vegetables instead of french fries or chips.  Where can you learn more?  Go to https://www."[employer].net/patientEd and enter O407 to learn more about \"Learning About the Mediterranean Diet.\"  Current as of: May 9, 2022               Content Version: 13.5  © 6549-6673 [employer].   Care instructions adapted under license by [employer]. If you have questions about a medical condition or this instruction, always ask your healthcare professional. [employer] disclaims any warranty or liability for your use of this information.        Please visit www.cardiosmart. org for more information regarding cardiovascular disease prevention and treatment.

## 2023-02-15 NOTE — PROGRESS NOTES
[address] [place] [address] Court, [address] [facility]  [phone]    Patient Name:  Surekha Ku  YOB: 1965      Office Visit 2/17/2023    CHIEF COMPLAINT:    Chief Complaint   Patient presents with    CPAP/BiPAP     Has a cpap machine. Not using     Sleep Apnea    Sleep Study     F/u on sleep study    Results         HISTORY OF PRESENT ILLNESS:  Patient is seen today for follow-up of sleep apnea. She had a home sleep test from [facility] but was unable to use her machine. A new sleep study was ordered at last visit and she is seen today in follow-up for results. PSG completed 1/10/2023 with AHI 7.1 events per hour including 10 obstructive apneas and 15 hypopneas. She only had a 49.8% sleep efficiency. REM AHI was 61.8 events per hour. Lowest oxygen saturation was 78% with SPO2 less than 89% for total of 139 minutes of the test.     She has a machine but was intolerant to the pressure. It is set on APAP 5 to 20 cm. Pressure will be adjusted to APAP 6 to 13 cm. She was also given a full face mask but was never able to tolerate it. Patient states that she recently had COVID again over two weeks ago which makes three times of having COVID. She was given prednisone and states that her cough is completely gone. She denies any fever chills. She typically goes to bed around 10 PM and wakes up 2 to 3 hours later to use the restroom. She then goes back to bed until 7 AM. She usually feels rested and denies any daytime sleepiness or napping. She was recently placed on Ozempic 3 to 4 months ago and states that her hemoglobin A1c is much better controlled now. She has noticed a difference in daytime fatigue since being on this medication. Patient states that if she sleeps supine, she will wake herself up snoring. She usually doesn't wake in the night if she sleeps laterally. She reports a 50 pound weight loss since January 2022 with a current weight of 268 pounds.     She is followed by pulmonary at [facility], Dr. Yovanny Adams. She was encouraged to follow up with him given her severe hypoxemia but overall mild sleep apnea.      PFT's 8/2022 (from [facility]):  reduced FVC, no bronchodilator response, and normal diffusion capacity (would need lung volumes to confirm restriction)      Sleep Medicine 2/17/2023   Sitting and reading 0   Watching TV 0   Sitting, inactive in a public place (e.g. a theatre or a meeting) 0   As a passenger in a car for an hour without a break 1   Lying down to rest in the afternoon when circumstances permit 0   Sitting and talking to someone 0   Sitting quietly after a lunch without alcohol 0   In a car, while stopped for a few minutes in traffic 0   Eudora Sleepiness Score 1     PSG 1/10/23      Sleep Medicine 2/17/2023   Sitting and reading 0   Watching TV 0   Sitting, inactive in a public place (e.g. a theatre or a meeting) 0   As a passenger in a car for an hour without a break 1   Lying down to rest in the afternoon when circumstances permit 0   Sitting and talking to someone 0   Sitting quietly after a lunch without alcohol 0   In a car, while stopped for a few minutes in traffic 0   Eudora Sleepiness Score 1          Past Medical History:   Diagnosis Date    Arthritis     Diabetes ([address]     GERD (gastroesophageal reflux disease)     Hypertension     KENNA (obstructive sleep apnea) 5/28/2020    Psychiatric disorder          Patient Active Problem List   Diagnosis    Abnormal EKG    COVID-19    Abnormal finding on cardiovascular stress test    Other chest pain    SOB (shortness of breath)    Tachycardia    KENNA (obstructive sleep apnea)    Morbid obesity ([address]    Essential hypertension, benign    Bradycardia    Palpitation    Type 2 diabetes mellitus ([address]    Pure hypercholesterolemia    Daytime sleepiness    Diastolic dysfunction without heart failure          Past Surgical History:   Procedure Laterality Date    CHOLECYSTECTOMY      GI      EGD    TUBAL LIGATION Social History     Socioeconomic History    Marital status:      Spouse name: Not on file    Number of children: Not on file    Years of education: Not on file    Highest education level: Not on file   Occupational History    Not on file   Tobacco Use    Smoking status: Never    Smokeless tobacco: Never   Substance and Sexual Activity    Alcohol use: Yes    Drug use: No    Sexual activity: Not on file   Other Topics Concern    Not on file   Social History Narrative    Not on file     Social Determinants of Health     Financial Resource Strain: Not on file   Food Insecurity: Not on file   Transportation Needs: Not on file   Physical Activity: Not on file   Stress: Not on file   Social Connections: Not on file   Intimate Partner Violence: Not on file   Housing Stability: Not on file         History reviewed. No pertinent family history. Allergies   Allergen Reactions    Bactrim [Sulfamethoxazole-Trimethoprim] Hives    Pravastatin Anaphylaxis    Levofloxacin Other (See Comments)     Headaches         Current Outpatient Medications   Medication Sig    levothyroxine (SYNTHROID) 125 MCG tablet     Semaglutide (OZEMPIC, 1 MG/DOSE, SC) Inject into the skin    nitroGLYCERIN (NITROSTAT) 0.4 MG SL tablet Place 1 tablet under the tongue every 5 minutes as needed for Chest pain    nebivolol (BYSTOLIC) 10 MG tablet Take 1 tablet by mouth in the morning. glipiZIDE (GLUCOTROL XL) 2.5 MG extended release tablet Take 2.5 mg by mouth in the morning. ALPRAZolam (XANAX) 0.25 MG tablet Take 0.25 mg by mouth 2 times daily.     betamethasone dipropionate 0.05 % lotion Apply topically 2 times daily    vitamin D (CHOLECALCIFEROL) 25 MCG (1000 UT) TABS tablet Take by mouth daily    hydroCHLOROthiazide (HYDRODIURIL) 12.5 MG tablet Take 12.5 mg by mouth daily    Levothyroxine Sodium 137 MCG/ML SOLN Take by mouth every morning (before breakfast)    metFORMIN (GLUCOPHAGE) 1000 MG tablet Take by mouth 2 times daily (with meals)    pantoprazole (PROTONIX) 40 MG tablet Take 40 mg by mouth 2 times daily     No current facility-administered medications for this visit. REVIEW OF SYSTEMS:   CONSTITUTIONAL: weight loss recently    There is no history of fever, chills, night sweats,  weight gain, persistent fatigue, or lethargy/hypersomnolence. CARDIAC:   No chest pain, pressure, discomfort, palpitations, orthopnea, murmurs, or edema. GI:   No dysphagia, heartburn reflux, nausea/vomiting, diarrhea, abdominal pain, or bleeding. NEURO:   There is no history of AMS, persistent headache, decreased level of consciousness, seizures, or motor or sensory deficits. PHYSICAL EXAM:    Vitals:    02/17/23 0946   BP: 120/60   Pulse: 85   Resp: 18   Temp: 97.5 °F (36.4 °C)   SpO2: 95%   Weight: 268 lb 6.4 oz (121.7 kg)   Height: 5' 6\" (1.676 m)        Body mass index is 43.32 kg/m². GENERAL APPEARANCE:   The patient is obese and in no respiratory distress. HEENT:   PERRL. Conjunctivae unremarkable. Nasal mucosa is without epistaxis, exudate, or polyps. Gums and dentition are unremarkable. There is oropharyngeal narrowing. NECK/LYMPHATIC:   Symmetrical with no elevation of jugular venous pulsation. Trachea midline. No thyroid enlargement. No cervical adenopathy. LUNGS:   Normal respiratory effort with symmetrical lung expansion. Breath sounds clear bilaterally. HEART:   There is a regular rate and rhythm. No murmur, rub, or gallop. There is no edema in the lower extremities. ABDOMEN:   Soft and non-tender. No hepatosplenomegaly. Bowel sounds are normal.     NEURO:   The patient is alert and oriented to person, place, and time. Memory appears intact and mood is normal.  No gross sensorimotor deficits are present. ASSESSMENT:  (Medical Decision Making)      Diagnosis Orders   1. KENNA (obstructive sleep apnea)     She has mild sleep apnea, severe in REM sleep, severe nocturnal hypoxemia.  She will restart pap therapy. Sample mask provided. Pressure adjusted to APAP 6-13 cm    The pathophysiology of obstructive sleep apnea was reviewed with the patient. It's potential to promote severe neurologic, cardiac, pulmonary, and gastrointestinal problems was discussed. Specifically, the increased incidence of hypertension, coronary artery disease, congestive heart failure, pulmonary hypertension, gastroesophageal reflux, pathologic hypersomnolence, memory loss, and glucose intolerance was related to the consequences of hypoxemia, hypercapnia, airway obstruction, and sympathetic overdrive. We also discussed the ability of nasal CPAP to correct these abnormalities through maintenance of a patent airway. Therapeutic options including surgery, oral appliances, and weight loss were also reviewed. 2. Morbid obesity (HCC)     BMI 43.32. she has lost weight, 8-10 lbs recently since starting Ozempic and about 50 lbs over the past year      3. Nocturnal hypoxemia     This is very severe and out of proportion to study findings. She was encouraged to follow up with pulmonary MD to re-evaluate lung function since having covid again         PLAN:  Change pressure to APAP 6-13 cm  Sample mask provided  Follow up with [facility] for re-evaluation. Nightly use on cpap encouraged  She will need DEBRA on cpap once AHI is controlled and she is tolerating therapy. Encouraged to use cpap while watching TV to help get used to the mask.       Follow up will be in 3 months or sooner if needed     Orders Placed This Encounter   Procedures    [address]     Equipped for life    [address]  Phone: [phone] [address]  Dept: [phone]      Patient Name: Surekha Ku  : 1965  Gender: female  Address: [address] [place] [address]   Patient phone: [phone] (home)       Primary Insurance: Payor: [employer] / Plan: [employer] / Product Type: *No Product type* /   Subscriber ID: [id number] - (Commercial)      AMB Supply Order  Order Details     DME Location:   Order Date: 2/17/2023   The primary encounter diagnosis was KENNA (obstructive sleep apnea). Diagnoses of Morbid obesity ([address] and Nocturnal hypoxemia were also pertinent to this visit. (  X   )Supplies Needed       apap Machine   (     ) CPAP Unit  (     ) Auto CPAP Unit  (     ) BiLevel Unit  (     ) Auto BiLevel Unit  (     ) ASV        (     ) Bilevel ST      Length of need: 12 months    Pressure:  6-13 cmH20  EPR:     Starting Ramp Pressure:   cm H20  Ramp Time: min        Patient had a diagnostic Apnea Hypopnea Index (AHI) of :    *SUPPLIES* Replace all as needed, or per coverage guidelines     Masks Type:  (    ) -Full Face Mask (1 per 3 mon)  (    ) -Full Mask (1 per month) Interface/Cushion      ( x ) -Nasal Mask (1 per 3 mon)  ( x  ) - Nasal Mask (1 per month) Interface/Cushion  (  x   ) -Pillow (2 per mon)  (     ) -Sbsglgrfr (1 per 6 mon)            Other Supplies:    (   X  )-Fnoancsr (1 per 6 mon)  (   X  )-Xttsee Tubing (1 per 3 mon)  (   X  )- Disposable Filter (2 per mon)  (   x  )-Drffmd Humidifier (1 per year)     (  x   )-Icpbherqy (sometimes used with Full Face Mask) (1 per 6 mos)  (    )-Tubing-without heat (1 per 3 mos)  (     )-Non-Disposable Filter (1 per 6 mos)  (  x   )-Water Chamber (1 per 6 mos)  (     )-Humidifier non-heated (1 per 5 yrs)      Signed Date: 2/17/2023  Electronically Signed By: DINA Braden - CNP  Electronically Dated:  2/17/2023       No orders of the defined types were placed in this encounter.         Collaborating Physician: Dr. Layo Jang    Over 50% of today's office visit was spent in face to face time reviewing test results, prognosis, importance of compliance, education about disease process, benefits of medications, instructions for management of acute flare-ups, and follow up plans. Total face to face time spent with patient was 25 minutes.         DINA Peña CNP  Electronically signed

## 2023-02-17 ENCOUNTER — OFFICE VISIT (OUTPATIENT)
Dept: SLEEP MEDICINE | Age: 58
End: 2023-02-17
Payer: COMMERCIAL

## 2023-02-17 VITALS
RESPIRATION RATE: 18 BRPM | TEMPERATURE: 97.5 F | HEART RATE: 85 BPM | OXYGEN SATURATION: 95 % | SYSTOLIC BLOOD PRESSURE: 120 MMHG | HEIGHT: 66 IN | DIASTOLIC BLOOD PRESSURE: 60 MMHG | WEIGHT: 268.4 LBS | BODY MASS INDEX: 43.13 KG/M2

## 2023-02-17 DIAGNOSIS — E66.01 MORBID OBESITY (HCC): ICD-10-CM

## 2023-02-17 DIAGNOSIS — G47.33 OSA (OBSTRUCTIVE SLEEP APNEA): Primary | ICD-10-CM

## 2023-02-17 DIAGNOSIS — G47.34 NOCTURNAL HYPOXEMIA: ICD-10-CM

## 2023-02-17 PROCEDURE — 3074F SYST BP LT 130 MM HG: CPT | Performed by: NURSE PRACTITIONER

## 2023-02-17 PROCEDURE — 99214 OFFICE O/P EST MOD 30 MIN: CPT | Performed by: NURSE PRACTITIONER

## 2023-02-17 PROCEDURE — 3078F DIAST BP <80 MM HG: CPT | Performed by: NURSE PRACTITIONER

## 2023-02-17 ASSESSMENT — SLEEP AND FATIGUE QUESTIONNAIRES
HOW LIKELY ARE YOU TO NOD OFF OR FALL ASLEEP WHEN YOU ARE A PASSENGER IN A CAR FOR AN HOUR WITHOUT A BREAK: 1
HOW LIKELY ARE YOU TO NOD OFF OR FALL ASLEEP WHILE WATCHING TV: 0
HOW LIKELY ARE YOU TO NOD OFF OR FALL ASLEEP WHILE SITTING AND TALKING TO SOMEONE: 0
HOW LIKELY ARE YOU TO NOD OFF OR FALL ASLEEP WHILE SITTING INACTIVE IN A PUBLIC PLACE: 0
ESS TOTAL SCORE: 1
HOW LIKELY ARE YOU TO NOD OFF OR FALL ASLEEP IN A CAR, WHILE STOPPED FOR A FEW MINUTES IN TRAFFIC: 0
HOW LIKELY ARE YOU TO NOD OFF OR FALL ASLEEP WHILE SITTING QUIETLY AFTER LUNCH WITHOUT ALCOHOL: 0
HOW LIKELY ARE YOU TO NOD OFF OR FALL ASLEEP WHILE LYING DOWN TO REST IN THE AFTERNOON WHEN CIRCUMSTANCES PERMIT: 0
HOW LIKELY ARE YOU TO NOD OFF OR FALL ASLEEP WHILE SITTING AND READING: 0

## 2023-02-17 NOTE — PATIENT INSTRUCTIONS
Change pressure to APAP 6-13 cm  Sample F&P Alexus nasal mask provided    Nightly use on therapy. Use it in the evenings while watching TV to help get used to having it on.     Follow up will be in 3 months or sooner if needed

## 2023-02-21 ENCOUNTER — TELEPHONE (OUTPATIENT)
Dept: CARDIOLOGY CLINIC | Age: 58
End: 2023-02-21

## 2023-02-21 NOTE — TELEPHONE ENCOUNTER
Received forms on fax from [employer].  Placed in clinical care team basket ( [address]) Bette Gutiérrez

## 2023-07-24 ENCOUNTER — TELEPHONE (OUTPATIENT)
Age: 58
End: 2023-07-24

## 2023-07-25 NOTE — TELEPHONE ENCOUNTER
Disability Forms/Letter received from [employer]. Forms completed and faxed to [employer] [facility] at [phone]. Fax confirmation received. Copy given to Medical records to be scanned.

## 2023-09-12 ENCOUNTER — TELEPHONE (OUTPATIENT)
Age: 58
End: 2023-09-12

## 2023-09-12 ENCOUNTER — TELEPHONE (OUTPATIENT)
Dept: SLEEP MEDICINE | Age: 58
End: 2023-09-12

## 2023-09-12 NOTE — TELEPHONE ENCOUNTER
Pt states her HR gets high (around 100-106) when she wakes up or is up doing chores. States she hasn't been feeling well and has been experiencing some SOB. Denies current symptoms.

## 2023-09-12 NOTE — TELEPHONE ENCOUNTER
Pt states her HR goes up when she wakes up or when she is doing chores, having SOB. States PCP wants pt seen and may need a monitor. Explained HR of 100-106 with activity is not concerning. Pt states she is starting pulmonary rehab next week for \"long COVID. \" Asking to be seen. Scheduled 9/15 at 10:00 in [place] office.

## 2023-09-14 NOTE — PROGRESS NOTES
[address] [facility]  [address] [address]  PHONE: [phone]      09/15/23    NAME:  Guillermo Marina  : 1965  MRN: [id number]         SUBJECTIVE:   Guillermo Marina is a 60 y.o. female seen for a follow up visit regarding the following:     Chief Complaint   Patient presents with    Shortness of Breath    Palpitations            HPI:  Follow up  Shortness of Breath and Palpitations   . Follow up dyspnea, morbid obesity, chest pain, palpitations, working on lifestyle modification and weight loss at last visit, but sadly weight has continued to climb. Short of breath, wakes up at night with dyspnea and palpitations, anxiety. Says she can't wear CPAP, has a follow up appointment with the sleep center. She is also been referred for pulmonary rehab, pulmonologist thinking possible long COVID. She does not get any physical activity citing her dyspnea and back and joint pain, states her orthopedist recommended a rowing machine. PAST CARDIAC HISTORY:         [facility] - normal coronary arteries, normal LVEDP  2021 - ambulatory monitors without significant arrhythmia. Mild bradycardia during sleep - referred for sleep study         Probably normal stress echo (images limited)  2022       EF 55-60%, abn DF, normal valves  2023       14 day monitor - normal tracing, one symptom of fatigue entered with NSR 80        Abnormal sleep study AHI 7 with desaturation - CPAP ordered               Key CAD CHF Meds            nebivolol (BYSTOLIC) 10 MG tablet (Taking)    Sig - Route:  Take 1 tablet by mouth daily - Oral    hydroCHLOROthiazide (HYDRODIURIL) 12.5 MG tablet (Taking)    Class: Historical Med          Key Antihyperglycemic Medications            glipiZIDE (GLUCOTROL) 5 MG tablet (Taking)    Class: Historical Med    Semaglutide (OZEMPIC, 1 MG/DOSE, SC) (Taking)    Class: Historical Med    metFORMIN (GLUCOPHAGE) 1000 MG tablet (Taking)    Class:

## 2023-09-15 ENCOUNTER — OFFICE VISIT (OUTPATIENT)
Age: 58
End: 2023-09-15
Payer: COMMERCIAL

## 2023-09-15 VITALS
DIASTOLIC BLOOD PRESSURE: 84 MMHG | HEART RATE: 77 BPM | BODY MASS INDEX: 45.32 KG/M2 | HEIGHT: 66 IN | WEIGHT: 282 LBS | SYSTOLIC BLOOD PRESSURE: 122 MMHG

## 2023-09-15 DIAGNOSIS — E78.00 PURE HYPERCHOLESTEROLEMIA: ICD-10-CM

## 2023-09-15 DIAGNOSIS — E66.01 MORBID OBESITY (HCC): ICD-10-CM

## 2023-09-15 DIAGNOSIS — E11.9 TYPE 2 DIABETES MELLITUS WITHOUT COMPLICATION, WITHOUT LONG-TERM CURRENT USE OF INSULIN (HCC): ICD-10-CM

## 2023-09-15 DIAGNOSIS — G47.33 OSA (OBSTRUCTIVE SLEEP APNEA): ICD-10-CM

## 2023-09-15 DIAGNOSIS — R06.02 SOB (SHORTNESS OF BREATH): ICD-10-CM

## 2023-09-15 DIAGNOSIS — I51.89 DIASTOLIC DYSFUNCTION WITHOUT HEART FAILURE: ICD-10-CM

## 2023-09-15 DIAGNOSIS — I10 ESSENTIAL HYPERTENSION, BENIGN: Primary | ICD-10-CM

## 2023-09-15 PROCEDURE — 93000 ELECTROCARDIOGRAM COMPLETE: CPT | Performed by: INTERNAL MEDICINE

## 2023-09-15 PROCEDURE — 3074F SYST BP LT 130 MM HG: CPT | Performed by: INTERNAL MEDICINE

## 2023-09-15 PROCEDURE — 99214 OFFICE O/P EST MOD 30 MIN: CPT | Performed by: INTERNAL MEDICINE

## 2023-09-15 PROCEDURE — 3044F HG A1C LEVEL LT 7.0%: CPT | Performed by: INTERNAL MEDICINE

## 2023-09-15 PROCEDURE — 3079F DIAST BP 80-89 MM HG: CPT | Performed by: INTERNAL MEDICINE

## 2023-09-15 RX ORDER — PHENAZOPYRIDINE HYDROCHLORIDE 200 MG/1
TABLET, FILM COATED ORAL
COMMUNITY
Start: 2023-09-12

## 2023-09-15 RX ORDER — MELOXICAM 7.5 MG/1
TABLET ORAL
COMMUNITY
Start: 2023-09-12

## 2023-09-15 RX ORDER — GLIPIZIDE 5 MG/1
TABLET ORAL
COMMUNITY
Start: 2023-09-12

## 2023-09-15 RX ORDER — SOLIFENACIN SUCCINATE 5 MG/1
TABLET, FILM COATED ORAL
COMMUNITY
Start: 2023-09-12

## 2023-09-15 RX ORDER — ESTRADIOL 0.1 MG/G
CREAM VAGINAL
COMMUNITY
Start: 2023-09-12

## 2023-09-15 ASSESSMENT — ENCOUNTER SYMPTOMS: SHORTNESS OF BREATH: 1

## 2023-09-21 ENCOUNTER — TELEPHONE (OUTPATIENT)
Dept: SLEEP MEDICINE | Age: 58
End: 2023-09-21

## 2023-09-21 NOTE — TELEPHONE ENCOUNTER
Pt was schedule to see Dr Alegria Place on 9/22/23 but will not be able to come in, please contact and re-schedule

## 2023-11-20 NOTE — PROGRESS NOTES
[address] [facility]  [address] [facility], [facility], [address]  PHONE: [phone]      23    NAME:  Pauline Barr  : 1965  MRN: [id number]         SUBJECTIVE:   Pauline Barr is a 60 y.o. female seen for a follow up visit regarding the following:     Chief Complaint   Patient presents with    Hypertension    Palpitations            HPI:  Follow up  Hypertension and Palpitations   . Follow up dyspnea, morbid obesity, chest pain, palpitations, sleep apnea intolerant of CPAP. Her dobutamine stress echo was normal.  She was to follow up with sleep center and pulmonary as previously noted, and appears she's been going to pulmonary rehab with working diagnosis [address]. She feels pretty much the same-tired, short of breath. Palpitations, says her heart rate will go up and her oxygen will go down but not doing it with the treadmill just occurring randomly. Wore a monitor in January for similar which was normal but she can't really say if its the same or worse. PAST CARDIAC HISTORY:         [facility] - normal coronary arteries, normal LVEDP  2021 - ambulatory monitors without significant arrhythmia. Mild bradycardia during sleep - referred for sleep study         Probably normal stress echo (images limited)  2022       EF 55-60%, abn DF, normal valves  2023       14 day monitor - normal tracing, one symptom of fatigue entered with NSR 80        Abnormal sleep study AHI 7 with desaturation - CPAP ordered/not tolerated  Oct 2023        DSE - normal SF, abn DF, normal DSE                     Key CAD CHF Meds            nebivolol (BYSTOLIC) 10 MG tablet (Taking)    Sig - Route:  Take 1 tablet by mouth daily - Oral    hydroCHLOROthiazide (HYDRODIURIL) 12.5 MG tablet (Taking)    Class: Historical Med          Key Antihyperglycemic Medications            Semaglutide (OZEMPIC, 1 MG/DOSE, SC) (Taking)    Class: Historical Med    metFORMIN (GLUCOPHAGE) 1000 MG

## 2023-11-21 ENCOUNTER — OFFICE VISIT (OUTPATIENT)
Age: 58
End: 2023-11-21
Payer: COMMERCIAL

## 2023-11-21 VITALS
WEIGHT: 281 LBS | BODY MASS INDEX: 45.16 KG/M2 | DIASTOLIC BLOOD PRESSURE: 78 MMHG | SYSTOLIC BLOOD PRESSURE: 114 MMHG | HEIGHT: 66 IN | HEART RATE: 84 BPM

## 2023-11-21 DIAGNOSIS — E78.00 PURE HYPERCHOLESTEROLEMIA: ICD-10-CM

## 2023-11-21 DIAGNOSIS — R00.2 PALPITATIONS: ICD-10-CM

## 2023-11-21 DIAGNOSIS — R06.02 SHORTNESS OF BREATH: Primary | ICD-10-CM

## 2023-11-21 DIAGNOSIS — I10 ESSENTIAL HYPERTENSION, BENIGN: ICD-10-CM

## 2023-11-21 DIAGNOSIS — E66.01 MORBID OBESITY (HCC): ICD-10-CM

## 2023-11-21 PROCEDURE — 99214 OFFICE O/P EST MOD 30 MIN: CPT | Performed by: INTERNAL MEDICINE

## 2023-11-21 PROCEDURE — 3074F SYST BP LT 130 MM HG: CPT | Performed by: INTERNAL MEDICINE

## 2023-11-21 PROCEDURE — 3078F DIAST BP <80 MM HG: CPT | Performed by: INTERNAL MEDICINE

## 2023-11-21 ASSESSMENT — ENCOUNTER SYMPTOMS: SHORTNESS OF BREATH: 1

## 2023-11-21 NOTE — PATIENT INSTRUCTIONS
Patient Education        Learning About the Mediterranean Diet  What is the [address]? The Mediterranean diet is a style of eating rather than a diet plan. It features foods eaten in [facility], [place], [place], and other countries along the [facility]. It emphasizes eating foods like fish, fruits, vegetables, beans, high-fiber breads and whole grains, nuts, and olive oil. This style of eating includes limited red meat, cheese, and sweets. Why choose the Mediterranean diet? A Mediterranean-style diet may improve heart health. It contains more fat than other heart-healthy diets. But the fats are mainly from nuts, unsaturated oils (such as fish oils and olive oil), and certain nut or seed oils (such as canola, soybean, or flaxseed oil). These fats may help protect the heart and blood vessels. How can you get started on the Mediterranean diet? Here are some things you can do to switch to a more Mediterranean way of eating. What to eat  Eat a variety of fruits and vegetables each day, such as grapes, blueberries, tomatoes, broccoli, peppers, figs, olives, spinach, eggplant, beans, lentils, and chickpeas. Eat a variety of whole-grain foods each day, such as oats, brown rice, and whole wheat bread, pasta, and couscous. Eat fish at least 2 times a week. Try tuna, salmon, mackerel, lake trout, herring, or sardines. Eat moderate amounts of low-fat dairy products, such as milk, cheese, or yogurt. Eat moderate amounts of poultry and eggs. Choose healthy (unsaturated) fats, such as nuts, olive oil, and certain nut or seed oils like canola, soybean, and flaxseed. Limit unhealthy (saturated) fats, such as butter, palm oil, and coconut oil. And limit fats found in animal products, such as meat and dairy products made with whole milk. Try to eat red meat only a few times a month in very small amounts. Limit sweets and desserts to only a few times a week.  This includes sugar-sweetened

## 2024-01-11 ENCOUNTER — PATIENT MESSAGE (OUTPATIENT)
Age: 59
End: 2024-01-11

## 2024-01-11 NOTE — TELEPHONE ENCOUNTER
Spoke with pt about episode at pain clinic, pt stated that she was have blurred vision/high BP lasted for 10 mins sat down and vision returned to normal and BP went down to 139/89. Pt stated she drinks 2 x 3 bottle of water with coke zero and sweet tea. Advised pt on BP protocol and hydration. Recommend calling PCP about blurred vision.

## 2024-01-26 NOTE — PROGRESS NOTES
[facility] CARDIOLOGY  [address] [place] [address], [address]  [place] [address]  PHONE: [phone]      24    NAME:  Pedro Galeano  : 1965  MRN: [id number]         SUBJECTIVE:   Pedro Galeano is a 58 y.o. female seen for a follow up visit regarding the following:     Chief Complaint   Patient presents with    Hypertension            HPI:  Follow up  Hypertension   .    Follow up dyspnea, morbid obesity, chest pain, palpitations, sleep apnea intolerant of CPAP.  Working diagnosis long COVID, symptoms having failed to respond to several interventions. In pulmonary rehab, finished about 2 weeks ago. She's continued treadmill for 10-15 minutes and resistance bands.  30 day monitor was normal.  She says she is worse than she was when she started pulmonary rehab. She offers, unprompted, that she might need to take something for anxiety.              PAST CARDIAC HISTORY:  REPORTED ANAPHYLAXIS TO STATIN THERAPY         [facility] - normal coronary arteries, normal LVEDP  2021 - ambulatory monitors without significant arrhythmia.  Mild bradycardia during sleep - referred for sleep study         Probably normal stress echo (images limited)  2022       EF 55-60%, abn DF, normal valves  2023       14 day monitor - normal tracing, one symptom of fatigue entered with NSR 80        Abnormal sleep study AHI 7 with desaturation - CPAP ordered/not tolerated  Oct 2023        DSE - normal SF, abn DF, normal DSE  Dec 2023       Normal 30 day monitor, no diary entries                      Key CAD CHF Meds            nebivolol (BYSTOLIC) 10 MG tablet (Taking)    Sig - Route: Take 1 tablet by mouth daily - Oral    hydroCHLOROthiazide (HYDRODIURIL) 12.5 MG tablet (Taking)    Class: Historical Med          Key Antihyperglycemic Medications            LEVEMIR FLEXPEN 100 UNIT/ML injection pen (Taking)    Class: Historical Med    metFORMIN (GLUCOPHAGE) 1000 MG tablet (Taking)    Class: Historical Med

## 2024-01-29 ENCOUNTER — OFFICE VISIT (OUTPATIENT)
Age: 59
End: 2024-01-29
Payer: COMMERCIAL

## 2024-01-29 VITALS
HEIGHT: 66 IN | HEART RATE: 68 BPM | BODY MASS INDEX: 46.61 KG/M2 | SYSTOLIC BLOOD PRESSURE: 132 MMHG | WEIGHT: 290 LBS | DIASTOLIC BLOOD PRESSURE: 84 MMHG

## 2024-01-29 DIAGNOSIS — U07.1 COVID-19: ICD-10-CM

## 2024-01-29 DIAGNOSIS — E11.9 TYPE 2 DIABETES MELLITUS WITHOUT COMPLICATION, WITHOUT LONG-TERM CURRENT USE OF INSULIN (HCC): ICD-10-CM

## 2024-01-29 DIAGNOSIS — R00.2 PALPITATION: ICD-10-CM

## 2024-01-29 DIAGNOSIS — E66.01 MORBID OBESITY (HCC): ICD-10-CM

## 2024-01-29 DIAGNOSIS — I10 ESSENTIAL HYPERTENSION, BENIGN: ICD-10-CM

## 2024-01-29 DIAGNOSIS — R06.02 SOB (SHORTNESS OF BREATH): Primary | ICD-10-CM

## 2024-01-29 DIAGNOSIS — Z01.818 PRE-OP EVALUATION: ICD-10-CM

## 2024-01-29 PROCEDURE — 3075F SYST BP GE 130 - 139MM HG: CPT | Performed by: INTERNAL MEDICINE

## 2024-01-29 PROCEDURE — 99214 OFFICE O/P EST MOD 30 MIN: CPT | Performed by: INTERNAL MEDICINE

## 2024-01-29 PROCEDURE — 3079F DIAST BP 80-89 MM HG: CPT | Performed by: INTERNAL MEDICINE

## 2024-01-29 RX ORDER — NEBIVOLOL 10 MG/1
10 TABLET ORAL DAILY
Qty: 90 TABLET | Refills: 3 | Status: SHIPPED | OUTPATIENT
Start: 2024-01-29

## 2024-01-29 RX ORDER — INSULIN DETEMIR 100 [IU]/ML
10 INJECTION, SOLUTION SUBCUTANEOUS DAILY
COMMUNITY
Start: 2023-12-22

## 2024-01-29 ASSESSMENT — ENCOUNTER SYMPTOMS: SHORTNESS OF BREATH: 1

## 2024-05-02 RX ORDER — NEBIVOLOL 10 MG/1
10 TABLET ORAL DAILY
Qty: 90 TABLET | Refills: 3 | Status: SHIPPED | OUTPATIENT
Start: 2024-05-02

## 2025-03-03 NOTE — PROGRESS NOTES
hypertension, benign    Type 2 diabetes mellitus without complication, without long-term current use of insulin (HCC)    Morbid obesity    Pure hypercholesterolemia    Other orders  -     nebivolol (BYSTOLIC) 10 MG tablet; Take 1 tablet by mouth daily  -     nitroGLYCERIN (NITROSTAT) 0.4 MG SL tablet; Place 1 tablet under the tongue every 5 minutes as needed for Chest pain          IMPRESSION:    Offered to repeat LHC (stress test 18 months ago, last cath ~ 6 years ago).  For now, she wants to try to get follow up with pulmonary having initially improved with their ministrations.  Will have her follow up sooner, 3 months, but she should contact me in the interim if worse or if she opts to have LHC.     BP at target, continue meds    Good glycemic control having gotten out of control for a time after COVID        Return in about 3 months (around 6/4/2025).          Thank you for allowing me to participate in this patient's care.  Please call or contact me if there are any questions or concerns regarding the above.      MARILY RICKS MD  03/04/25  2:36 PM

## 2025-03-04 ENCOUNTER — OFFICE VISIT (OUTPATIENT)
Age: 60
End: 2025-03-04
Payer: COMMERCIAL

## 2025-03-04 VITALS
HEART RATE: 60 BPM | WEIGHT: 293 LBS | SYSTOLIC BLOOD PRESSURE: 118 MMHG | HEIGHT: 66 IN | BODY MASS INDEX: 47.09 KG/M2 | DIASTOLIC BLOOD PRESSURE: 72 MMHG

## 2025-03-04 DIAGNOSIS — I51.89 DIASTOLIC DYSFUNCTION WITHOUT HEART FAILURE: Primary | ICD-10-CM

## 2025-03-04 DIAGNOSIS — E66.01 MORBID OBESITY: ICD-10-CM

## 2025-03-04 DIAGNOSIS — E11.9 TYPE 2 DIABETES MELLITUS WITHOUT COMPLICATION, WITHOUT LONG-TERM CURRENT USE OF INSULIN (HCC): ICD-10-CM

## 2025-03-04 DIAGNOSIS — I10 ESSENTIAL HYPERTENSION, BENIGN: ICD-10-CM

## 2025-03-04 DIAGNOSIS — E78.00 PURE HYPERCHOLESTEROLEMIA: ICD-10-CM

## 2025-03-04 PROCEDURE — 93000 ELECTROCARDIOGRAM COMPLETE: CPT | Performed by: INTERNAL MEDICINE

## 2025-03-04 PROCEDURE — 3078F DIAST BP <80 MM HG: CPT | Performed by: INTERNAL MEDICINE

## 2025-03-04 PROCEDURE — 99214 OFFICE O/P EST MOD 30 MIN: CPT | Performed by: INTERNAL MEDICINE

## 2025-03-04 PROCEDURE — 3074F SYST BP LT 130 MM HG: CPT | Performed by: INTERNAL MEDICINE

## 2025-03-04 RX ORDER — NITROGLYCERIN 0.4 MG/1
0.4 TABLET SUBLINGUAL EVERY 5 MIN PRN
Qty: 25 TABLET | Refills: 3 | Status: SHIPPED | OUTPATIENT
Start: 2025-03-04

## 2025-03-04 RX ORDER — INSULIN LISPRO 200 [IU]/ML
2 INJECTION, SOLUTION SUBCUTANEOUS
COMMUNITY
Start: 2024-09-19

## 2025-03-04 RX ORDER — NEBIVOLOL 10 MG/1
10 TABLET ORAL DAILY
Qty: 90 TABLET | Refills: 3 | Status: SHIPPED | OUTPATIENT
Start: 2025-03-04

## 2025-03-04 ASSESSMENT — ENCOUNTER SYMPTOMS: SHORTNESS OF BREATH: 1

## 2025-06-25 NOTE — PROGRESS NOTES
[facility] CARDIOLOGY  [address] [place] [address], [address]  [place] [address]  PHONE: [phone]      25    NAME:  Pedro Galeano  : 1965  MRN: [id number]         SUBJECTIVE:   Pedro Galeano is a 60 y.o. female seen for a follow up visit regarding the following:     Chief Complaint   Patient presents with    Follow-up    Hypertension            HPI:  Follow up  Follow-up and Hypertension   .    Follow up dyspnea, morbid obesity, chest pain, palpitations, sleep apnea intolerant of CPAP.  Working diagnosis long COVID, symptoms having failed to respond to several interventions. Completed pulmonary rehab.     Offered repeat cath last visit for worsening sxs, she opted to try pulmonary follow up since her prior stress echo was normal and normal coronaries in 2018     She continues to feel \"same old/same old\".  She has ongoing headaches for a couple of weeks with associated jagged bright lights.  Ongoing chest discomfort, vague, lasts a few minutes, occurs once or twice a day, feels dizzy and disoriented with it.  Her weight continues to climb.         PAST CARDIAC HISTORY:  REPORTED ANAPHYLAXIS TO STATIN THERAPY         [facility] - normal coronary arteries, normal LVEDP  2021 - ambulatory monitors without significant arrhythmia.  Mild bradycardia during sleep - referred for sleep study         Probably normal stress echo (images limited)  2022       EF 55-60%, abn DF, normal valves  2023       14 day monitor - normal tracing, one symptom of fatigue entered with NSR 80        Abnormal sleep study AHI 7 with desaturation - CPAP ordered/not tolerated  Oct 2023        DSE - normal SF, abn DF, normal DSE  Dec 2023       Normal 30 day monitor, no diary entries              Cardiac Medications       Nitrates       nitroGLYCERIN (NITROSTAT) 0.4 MG SL tablet Place 1 tablet under the tongue every 5 minutes as needed for Chest pain       Thiazides and Thiazide-Like Diuretics

## 2025-06-26 ENCOUNTER — OFFICE VISIT (OUTPATIENT)
Age: 60
End: 2025-06-26
Payer: COMMERCIAL

## 2025-06-26 VITALS
HEART RATE: 76 BPM | HEIGHT: 66 IN | BODY MASS INDEX: 47.09 KG/M2 | SYSTOLIC BLOOD PRESSURE: 124 MMHG | DIASTOLIC BLOOD PRESSURE: 78 MMHG | WEIGHT: 293 LBS

## 2025-06-26 DIAGNOSIS — U09.9 COVID-19 LONG HAULER: Primary | ICD-10-CM

## 2025-06-26 DIAGNOSIS — I10 ESSENTIAL HYPERTENSION, BENIGN: ICD-10-CM

## 2025-06-26 DIAGNOSIS — R07.89 CHEST DISCOMFORT: ICD-10-CM

## 2025-06-26 DIAGNOSIS — E11.9 TYPE 2 DIABETES MELLITUS WITHOUT COMPLICATION, WITHOUT LONG-TERM CURRENT USE OF INSULIN (HCC): ICD-10-CM

## 2025-06-26 DIAGNOSIS — E66.01 MORBID OBESITY (HCC): ICD-10-CM

## 2025-06-26 PROCEDURE — 3074F SYST BP LT 130 MM HG: CPT | Performed by: INTERNAL MEDICINE

## 2025-06-26 PROCEDURE — 99214 OFFICE O/P EST MOD 30 MIN: CPT | Performed by: INTERNAL MEDICINE

## 2025-06-26 PROCEDURE — 3078F DIAST BP <80 MM HG: CPT | Performed by: INTERNAL MEDICINE

## 2025-06-26 ASSESSMENT — ENCOUNTER SYMPTOMS: SHORTNESS OF BREATH: 1

## 2025-08-27 ENCOUNTER — OFFICE VISIT (OUTPATIENT)
Age: 60
End: 2025-08-27
Payer: COMMERCIAL

## 2025-08-27 VITALS
HEART RATE: 80 BPM | SYSTOLIC BLOOD PRESSURE: 142 MMHG | WEIGHT: 293 LBS | HEIGHT: 65 IN | DIASTOLIC BLOOD PRESSURE: 70 MMHG | BODY MASS INDEX: 48.82 KG/M2

## 2025-08-27 DIAGNOSIS — Z79.4 TYPE 2 DIABETES MELLITUS WITHOUT COMPLICATION, WITH LONG-TERM CURRENT USE OF INSULIN (HCC): ICD-10-CM

## 2025-08-27 DIAGNOSIS — R06.02 SHORTNESS OF BREATH: Primary | ICD-10-CM

## 2025-08-27 DIAGNOSIS — U09.9 COVID-19 LONG HAULER: ICD-10-CM

## 2025-08-27 DIAGNOSIS — I10 ESSENTIAL HYPERTENSION, BENIGN: ICD-10-CM

## 2025-08-27 DIAGNOSIS — G47.33 OBSTRUCTIVE SLEEP APNEA: ICD-10-CM

## 2025-08-27 DIAGNOSIS — E11.9 TYPE 2 DIABETES MELLITUS WITHOUT COMPLICATION, WITH LONG-TERM CURRENT USE OF INSULIN (HCC): ICD-10-CM

## 2025-08-27 DIAGNOSIS — E66.01 MORBID OBESITY (HCC): ICD-10-CM

## 2025-08-27 PROCEDURE — 3078F DIAST BP <80 MM HG: CPT | Performed by: INTERNAL MEDICINE

## 2025-08-27 PROCEDURE — 99214 OFFICE O/P EST MOD 30 MIN: CPT | Performed by: INTERNAL MEDICINE

## 2025-08-27 PROCEDURE — 3077F SYST BP >= 140 MM HG: CPT | Performed by: INTERNAL MEDICINE

## 2025-08-27 ASSESSMENT — ENCOUNTER SYMPTOMS: SHORTNESS OF BREATH: 1
